# Patient Record
Sex: FEMALE | Race: WHITE | URBAN - METROPOLITAN AREA
[De-identification: names, ages, dates, MRNs, and addresses within clinical notes are randomized per-mention and may not be internally consistent; named-entity substitution may affect disease eponyms.]

---

## 2017-08-23 ENCOUNTER — OUTPATIENT (OUTPATIENT)
Dept: OUTPATIENT SERVICES | Facility: HOSPITAL | Age: 67
LOS: 1 days | Discharge: HOME | End: 2017-08-23

## 2017-08-23 DIAGNOSIS — Z12.31 ENCOUNTER FOR SCREENING MAMMOGRAM FOR MALIGNANT NEOPLASM OF BREAST: ICD-10-CM

## 2017-08-28 ENCOUNTER — OUTPATIENT (OUTPATIENT)
Dept: OUTPATIENT SERVICES | Facility: HOSPITAL | Age: 67
LOS: 1 days | Discharge: HOME | End: 2017-08-28

## 2017-08-28 DIAGNOSIS — R92.8 OTHER ABNORMAL AND INCONCLUSIVE FINDINGS ON DIAGNOSTIC IMAGING OF BREAST: ICD-10-CM

## 2018-05-28 ENCOUNTER — TRANSCRIPTION ENCOUNTER (OUTPATIENT)
Age: 68
End: 2018-05-28

## 2018-09-24 ENCOUNTER — OUTPATIENT (OUTPATIENT)
Dept: OUTPATIENT SERVICES | Facility: HOSPITAL | Age: 68
LOS: 1 days | Discharge: HOME | End: 2018-09-24

## 2018-09-24 DIAGNOSIS — Z12.31 ENCOUNTER FOR SCREENING MAMMOGRAM FOR MALIGNANT NEOPLASM OF BREAST: ICD-10-CM

## 2018-10-30 ENCOUNTER — OUTPATIENT (OUTPATIENT)
Dept: OUTPATIENT SERVICES | Facility: HOSPITAL | Age: 68
LOS: 1 days | Discharge: HOME | End: 2018-10-30

## 2018-10-30 DIAGNOSIS — R92.2 INCONCLUSIVE MAMMOGRAM: ICD-10-CM

## 2018-10-31 DIAGNOSIS — Z13.820 ENCOUNTER FOR SCREENING FOR OSTEOPOROSIS: ICD-10-CM

## 2018-10-31 DIAGNOSIS — Z78.0 ASYMPTOMATIC MENOPAUSAL STATE: ICD-10-CM

## 2018-10-31 DIAGNOSIS — Z87.310 PERSONAL HISTORY OF (HEALED) OSTEOPOROSIS FRACTURE: ICD-10-CM

## 2018-10-31 DIAGNOSIS — E55.9 VITAMIN D DEFICIENCY, UNSPECIFIED: ICD-10-CM

## 2018-10-31 DIAGNOSIS — M89.9 DISORDER OF BONE, UNSPECIFIED: ICD-10-CM

## 2019-01-15 ENCOUNTER — OUTPATIENT (OUTPATIENT)
Dept: OUTPATIENT SERVICES | Facility: HOSPITAL | Age: 69
LOS: 1 days | Discharge: HOME | End: 2019-01-15

## 2019-01-15 DIAGNOSIS — D51.9 VITAMIN B12 DEFICIENCY ANEMIA, UNSPECIFIED: ICD-10-CM

## 2019-10-25 ENCOUNTER — OUTPATIENT (OUTPATIENT)
Dept: OUTPATIENT SERVICES | Facility: HOSPITAL | Age: 69
LOS: 1 days | Discharge: HOME | End: 2019-10-25
Payer: MEDICARE

## 2019-10-25 DIAGNOSIS — Z12.31 ENCOUNTER FOR SCREENING MAMMOGRAM FOR MALIGNANT NEOPLASM OF BREAST: ICD-10-CM

## 2019-10-25 PROCEDURE — 77067 SCR MAMMO BI INCL CAD: CPT | Mod: 26

## 2019-10-25 PROCEDURE — 77063 BREAST TOMOSYNTHESIS BI: CPT | Mod: 26

## 2019-12-10 ENCOUNTER — APPOINTMENT (OUTPATIENT)
Dept: SURGERY | Facility: CLINIC | Age: 69
End: 2019-12-10
Payer: MEDICARE

## 2019-12-10 VITALS — WEIGHT: 153 LBS | HEIGHT: 68 IN | BODY MASS INDEX: 23.19 KG/M2

## 2019-12-10 PROCEDURE — 99203 OFFICE O/P NEW LOW 30 MIN: CPT

## 2019-12-10 NOTE — ASSESSMENT
[FreeTextEntry1] : Amber is a pleasant 69-year-old retired woman with a past medical history significant for hypothyroidism, reflex sympathetic dystrophy of the left upper extremity, an open cholecystectomy 42 years ago and a left inguinal hernia repair with mesh by Dr. Kirby 7 years ago now presenting with concerns about intermittent pain and swelling in the right groin suspicious for another hernia.\par \par Physical examination demonstrates a large strawberry size tender bulge in the right groin which is reducible with a moderate degree of difficulty consistent with a large protruding symptomatic right inguinal hernia warranting surgical repair. There is no evidence of incarceration or strangulation, and the patient denies any symptoms of obstruction.  Examination of her left groin demonstrates a mild weakness but no evidence of hernia recurrence. Her umbilical examination is unremarkable. She has no evidence of an incisional hernia. Her weight is normal with a current BMI of 23.\par \par I explained the pros and cons of surgery, as well as all risks, benefits, indications and alternatives of the procedure and the patient understood and agreed. Amber has a cruise scheduled for April and would prefer surgical intervention prior to that time. She was scheduled for the repair of her right inguinal hernia with mesh on Friday, January 17, 2020 under local with IV sedation at the [facility] with presurgical testing waived.  The patient was encouraged to avoid heavy lifting and strenuous activity in the interim, of course.

## 2019-12-10 NOTE — CONSULT LETTER
[Dear  ___] : Dear  [unfilled], [Courtesy Letter:] : I had the pleasure of seeing your patient, [unfilled], in my office today. [Consult Closing:] : Thank you very much for allowing me to participate in the care of this patient.  If you have any questions, please do not hesitate to contact me. [Please see my note below.] : Please see my note below. [DrYaakov  ___] : Dr. BEJARANO [FreeTextEntry3] : Respectfully,\par \par Jaylon Barrett M.D., FACS\par

## 2019-12-10 NOTE — PHYSICAL EXAM
[Normal Breath Sounds] : Normal breath sounds [No Rash or Lesion] : No rash or lesion [Alert] : alert [Calm] : calm [JVD] : no jugular venous distention  [de-identified] : soft and flat abdomen\par  [de-identified] : normal [de-identified] : healthy [de-identified] : right inguinal hernia

## 2020-01-17 ENCOUNTER — OUTPATIENT (OUTPATIENT)
Dept: OUTPATIENT SERVICES | Facility: HOSPITAL | Age: 70
LOS: 1 days | Discharge: HOME | End: 2020-01-17

## 2020-01-17 ENCOUNTER — APPOINTMENT (OUTPATIENT)
Dept: SURGERY | Facility: AMBULATORY SURGERY CENTER | Age: 70
End: 2020-01-17
Payer: MEDICARE

## 2020-01-17 VITALS
TEMPERATURE: 98 F | OXYGEN SATURATION: 97 % | SYSTOLIC BLOOD PRESSURE: 154 MMHG | DIASTOLIC BLOOD PRESSURE: 69 MMHG | HEART RATE: 69 BPM | WEIGHT: 147.05 LBS | HEIGHT: 69 IN | RESPIRATION RATE: 18 BRPM

## 2020-01-17 VITALS
HEART RATE: 64 BPM | DIASTOLIC BLOOD PRESSURE: 60 MMHG | SYSTOLIC BLOOD PRESSURE: 117 MMHG | RESPIRATION RATE: 13 BRPM | OXYGEN SATURATION: 99 %

## 2020-01-17 DIAGNOSIS — Z90.49 ACQUIRED ABSENCE OF OTHER SPECIFIED PARTS OF DIGESTIVE TRACT: Chronic | ICD-10-CM

## 2020-01-17 DIAGNOSIS — Z98.49 CATARACT EXTRACTION STATUS, UNSPECIFIED EYE: Chronic | ICD-10-CM

## 2020-01-17 DIAGNOSIS — Z98.890 OTHER SPECIFIED POSTPROCEDURAL STATES: Chronic | ICD-10-CM

## 2020-01-17 PROCEDURE — 49505 PRP I/HERN INIT REDUC >5 YR: CPT | Mod: RT

## 2020-01-17 RX ORDER — SODIUM CHLORIDE 9 MG/ML
1000 INJECTION, SOLUTION INTRAVENOUS
Refills: 0 | Status: DISCONTINUED | OUTPATIENT
Start: 2020-01-17 | End: 2020-02-03

## 2020-01-17 RX ORDER — LEVOTHYROXINE SODIUM 125 MCG
1 TABLET ORAL
Qty: 0 | Refills: 0 | DISCHARGE

## 2020-01-17 RX ORDER — ASPIRIN/CALCIUM CARB/MAGNESIUM 324 MG
1 TABLET ORAL
Qty: 0 | Refills: 0 | DISCHARGE

## 2020-01-17 RX ORDER — ONDANSETRON 8 MG/1
4 TABLET, FILM COATED ORAL ONCE
Refills: 0 | Status: DISCONTINUED | OUTPATIENT
Start: 2020-01-17 | End: 2020-02-03

## 2020-01-17 RX ORDER — ACETAMINOPHEN 500 MG
650 TABLET ORAL ONCE
Refills: 0 | Status: COMPLETED | OUTPATIENT
Start: 2020-01-17 | End: 2020-01-17

## 2020-01-17 RX ORDER — TRAMADOL HYDROCHLORIDE 50 MG/1
1 TABLET ORAL
Qty: 20 | Refills: 0
Start: 2020-01-17 | End: 2020-01-21

## 2020-01-17 RX ADMIN — SODIUM CHLORIDE 70 MILLILITER(S): 9 INJECTION, SOLUTION INTRAVENOUS at 16:00

## 2020-01-17 RX ADMIN — Medication 650 MILLIGRAM(S): at 16:40

## 2020-01-17 NOTE — ASU DISCHARGE PLAN (ADULT/PEDIATRIC) - CARE PROVIDER_API CALL
Jaylon Barrett)  Surgery  [address] [facility], [facility] [address]  [place] [address]  Phone: [phone]  Fax: [phone]  Scheduled Appointment: 01/28/2020

## 2020-01-24 DIAGNOSIS — K40.90 UNILATERAL INGUINAL HERNIA, WITHOUT OBSTRUCTION OR GANGRENE, NOT SPECIFIED AS RECURRENT: ICD-10-CM

## 2020-01-24 DIAGNOSIS — Z88.5 ALLERGY STATUS TO NARCOTIC AGENT: ICD-10-CM

## 2020-01-24 DIAGNOSIS — Z79.82 LONG TERM (CURRENT) USE OF ASPIRIN: ICD-10-CM

## 2020-01-24 DIAGNOSIS — Z91.041 RADIOGRAPHIC DYE ALLERGY STATUS: ICD-10-CM

## 2020-01-24 DIAGNOSIS — Z88.0 ALLERGY STATUS TO PENICILLIN: ICD-10-CM

## 2020-01-28 ENCOUNTER — APPOINTMENT (OUTPATIENT)
Dept: SURGERY | Facility: CLINIC | Age: 70
End: 2020-01-28
Payer: MEDICARE

## 2020-01-28 PROCEDURE — 99024 POSTOP FOLLOW-UP VISIT: CPT

## 2020-01-28 NOTE — CONSULT LETTER
[FreeTextEntry1] : Dear Dr. Jayden Altamirano, \par \par I had the pleasure of seeing your patient, MICHELLE MYHRE, in my office today. Please see my note below. \par \par Thank you very much for allowing me to participate in the care of this patient. If you have any questions, please do not hesitate to contact me. \par \par \par Respectfully,\par \par Jaylon Barrett M.D., FACS\par  \par \par \par \par cc: Dr. Chi Myers

## 2020-01-28 NOTE — ASSESSMENT
[FreeTextEntry1] : Amber underwent the repair of her very large pantaloon right inguinal hernia with mesh on January 17, 2020 under local with IV sedation without any problems or complications. Her wound is clean, dry and intact. There is no evidence of erythema, seroma formation or infection. She is tolerating a diet and having normal bowel movements. She denies any significant postoperative pain or discomfort at this time, and she did not take any narcotic medication after her procedure.\par \par Amber was counseled and reassured. She was discharged from the office with no specific followup necessary, but she knows to avoid any heavy lifting or strenuous activity for the next several weeks.

## 2020-10-06 PROBLEM — G90.50 COMPLEX REGIONAL PAIN SYNDROME I, UNSPECIFIED: Chronic | Status: ACTIVE | Noted: 2020-01-17

## 2020-10-06 PROBLEM — K46.9 UNSPECIFIED ABDOMINAL HERNIA WITHOUT OBSTRUCTION OR GANGRENE: Chronic | Status: ACTIVE | Noted: 2020-01-17

## 2020-11-05 ENCOUNTER — OUTPATIENT (OUTPATIENT)
Dept: OUTPATIENT SERVICES | Facility: HOSPITAL | Age: 70
LOS: 1 days | Discharge: HOME | End: 2020-11-05
Payer: MEDICARE

## 2020-11-05 DIAGNOSIS — Z98.49 CATARACT EXTRACTION STATUS, UNSPECIFIED EYE: Chronic | ICD-10-CM

## 2020-11-05 DIAGNOSIS — Z98.890 OTHER SPECIFIED POSTPROCEDURAL STATES: Chronic | ICD-10-CM

## 2020-11-05 DIAGNOSIS — Z12.31 ENCOUNTER FOR SCREENING MAMMOGRAM FOR MALIGNANT NEOPLASM OF BREAST: ICD-10-CM

## 2020-11-05 DIAGNOSIS — Z90.49 ACQUIRED ABSENCE OF OTHER SPECIFIED PARTS OF DIGESTIVE TRACT: Chronic | ICD-10-CM

## 2020-11-05 PROCEDURE — 77063 BREAST TOMOSYNTHESIS BI: CPT | Mod: 26

## 2020-11-05 PROCEDURE — 77067 SCR MAMMO BI INCL CAD: CPT | Mod: 26

## 2020-11-10 ENCOUNTER — APPOINTMENT (OUTPATIENT)
Dept: SURGERY | Facility: CLINIC | Age: 70
End: 2020-11-10
Payer: MEDICARE

## 2020-11-10 VITALS — BODY MASS INDEX: 21.98 KG/M2 | HEIGHT: 68 IN | WEIGHT: 145 LBS

## 2020-11-10 DIAGNOSIS — K40.90 UNILATERAL INGUINAL HERNIA, W/OUT OBSTRUCTION OR GANGRENE, NOT SPECIFIED AS RECURRENT: ICD-10-CM

## 2020-11-10 PROCEDURE — 99213 OFFICE O/P EST LOW 20 MIN: CPT

## 2020-11-10 PROCEDURE — 99214 OFFICE O/P EST MOD 30 MIN: CPT

## 2020-11-10 NOTE — ASSESSMENT
[FreeTextEntry1] : Amber presents back to the office approximately 10 months after the repair of her very large pantaloon right inguinal hernia with mesh complaining of intermittent tenderness and burning which began in July and has persisted since then. She claims she was perfect prior to that time with actually no symptoms. She became more active during the summer, of course, and enjoys walking regularly. She is often carrying heavy packages from the grocery store and doing moderate physical activity around the house. She does not remember one specific event which precipitated her symptoms.\par \par Physical examination demonstrates a well healed scar in the right groin with no evidence of hernia recurrence or delayed wound complications. She does have tenderness to deep palpation which is consistent with a chronic groin strain. She does intermittently have symptoms in the left groin as well but her examination of this area is normal as well with no hernia recurrence in the left groin either. Her umbilical examination is unremarkable. She lost 8 pounds over the last year and her current BMI is 22.\par \par Amber was counseled and reassured. I believe she sustained a significant right groin strain which is now chronic due to overexertion and I recommended alternating ice/heat therapy and nonsteroidal anti-inflammatory medication for the next 2 weeks, and avoiding strenuous physical activity whenever possible. I believe her symptoms will improve with time. She was encouraged to return to me in the future if these symptoms persist or worsen, of course.

## 2020-11-10 NOTE — PHYSICAL EXAM
[JVD] : no jugular venous distention  [Normal Breath Sounds] : Normal breath sounds [No Rash or Lesion] : No rash or lesion [Alert] : alert [Calm] : calm [de-identified] : healthy [de-identified] : normal [de-identified] : soft and flat abdomen\par  [de-identified] : no hernia recurrence in either groin

## 2021-01-11 ENCOUNTER — TRANSCRIPTION ENCOUNTER (OUTPATIENT)
Age: 71
End: 2021-01-11

## 2021-07-23 ENCOUNTER — RESULT REVIEW (OUTPATIENT)
Age: 71
End: 2021-07-23

## 2021-09-03 ENCOUNTER — TRANSCRIPTION ENCOUNTER (OUTPATIENT)
Age: 71
End: 2021-09-03

## 2021-11-18 ENCOUNTER — OUTPATIENT (OUTPATIENT)
Dept: OUTPATIENT SERVICES | Facility: HOSPITAL | Age: 71
LOS: 1 days | Discharge: HOME | End: 2021-11-18

## 2021-11-18 DIAGNOSIS — Z98.49 CATARACT EXTRACTION STATUS, UNSPECIFIED EYE: Chronic | ICD-10-CM

## 2021-11-18 DIAGNOSIS — Z90.49 ACQUIRED ABSENCE OF OTHER SPECIFIED PARTS OF DIGESTIVE TRACT: Chronic | ICD-10-CM

## 2021-11-18 DIAGNOSIS — Z98.890 OTHER SPECIFIED POSTPROCEDURAL STATES: Chronic | ICD-10-CM

## 2021-11-22 DIAGNOSIS — Z13.820 ENCOUNTER FOR SCREENING FOR OSTEOPOROSIS: ICD-10-CM

## 2021-11-22 DIAGNOSIS — Z87.310 PERSONAL HISTORY OF (HEALED) OSTEOPOROSIS FRACTURE: ICD-10-CM

## 2021-11-22 DIAGNOSIS — Z78.0 ASYMPTOMATIC MENOPAUSAL STATE: ICD-10-CM

## 2021-11-22 DIAGNOSIS — M89.9 DISORDER OF BONE, UNSPECIFIED: ICD-10-CM

## 2021-12-15 ENCOUNTER — OUTPATIENT (OUTPATIENT)
Dept: OUTPATIENT SERVICES | Facility: HOSPITAL | Age: 71
LOS: 1 days | Discharge: HOME | End: 2021-12-15
Payer: MEDICARE

## 2021-12-15 DIAGNOSIS — Z12.31 ENCOUNTER FOR SCREENING MAMMOGRAM FOR MALIGNANT NEOPLASM OF BREAST: ICD-10-CM

## 2021-12-15 DIAGNOSIS — Z98.49 CATARACT EXTRACTION STATUS, UNSPECIFIED EYE: Chronic | ICD-10-CM

## 2021-12-15 DIAGNOSIS — Z98.890 OTHER SPECIFIED POSTPROCEDURAL STATES: Chronic | ICD-10-CM

## 2021-12-15 DIAGNOSIS — Z90.49 ACQUIRED ABSENCE OF OTHER SPECIFIED PARTS OF DIGESTIVE TRACT: Chronic | ICD-10-CM

## 2021-12-15 PROCEDURE — 77063 BREAST TOMOSYNTHESIS BI: CPT | Mod: 26

## 2021-12-15 PROCEDURE — 77067 SCR MAMMO BI INCL CAD: CPT | Mod: 26

## 2021-12-17 ENCOUNTER — OUTPATIENT (OUTPATIENT)
Dept: OUTPATIENT SERVICES | Facility: HOSPITAL | Age: 71
LOS: 1 days | Discharge: HOME | End: 2021-12-17
Payer: MEDICARE

## 2021-12-17 DIAGNOSIS — Z90.49 ACQUIRED ABSENCE OF OTHER SPECIFIED PARTS OF DIGESTIVE TRACT: Chronic | ICD-10-CM

## 2021-12-17 DIAGNOSIS — Z98.890 OTHER SPECIFIED POSTPROCEDURAL STATES: Chronic | ICD-10-CM

## 2021-12-17 DIAGNOSIS — Z98.49 CATARACT EXTRACTION STATUS, UNSPECIFIED EYE: Chronic | ICD-10-CM

## 2021-12-17 DIAGNOSIS — R92.2 INCONCLUSIVE MAMMOGRAM: ICD-10-CM

## 2021-12-17 DIAGNOSIS — Z80.3 FAMILY HISTORY OF MALIGNANT NEOPLASM OF BREAST: ICD-10-CM

## 2021-12-17 PROCEDURE — 77049 MRI BREAST C-+ W/CAD BI: CPT | Mod: 26

## 2022-04-01 ENCOUNTER — TRANSCRIPTION ENCOUNTER (OUTPATIENT)
Age: 72
End: 2022-04-01

## 2022-04-26 ENCOUNTER — APPOINTMENT (OUTPATIENT)
Dept: SURGERY | Facility: CLINIC | Age: 72
End: 2022-04-26
Payer: MEDICARE

## 2022-04-26 VITALS — WEIGHT: 147 LBS | BODY MASS INDEX: 22.28 KG/M2 | HEIGHT: 68 IN

## 2022-04-26 DIAGNOSIS — S76.219A STRAIN OF ADDUCTOR MUSCLE, FASCIA AND TENDON OF UNSPECIFIED THIGH, INITIAL ENCOUNTER: ICD-10-CM

## 2022-04-26 DIAGNOSIS — R10.31 RIGHT LOWER QUADRANT PAIN: ICD-10-CM

## 2022-04-26 PROCEDURE — 99214 OFFICE O/P EST MOD 30 MIN: CPT

## 2022-04-26 NOTE — PHYSICAL EXAM
[JVD] : no jugular venous distention  [Normal Breath Sounds] : Normal breath sounds [No Rash or Lesion] : No rash or lesion [Alert] : alert [Calm] : calm [de-identified] : healthy [de-identified] : normal [de-identified] : soft and flat abdomen\par  [de-identified] : no hernia recurrence in either groin

## 2022-04-26 NOTE — CONSULT LETTER
[FreeTextEntry1] : Dear Dr. Jayden Altamirano, \par \par I had the pleasure of seeing your patient, MICHELLE MYHRE, in my office today. Please see my note below. \par \par Thank you very much for allowing me to participate in the care of this patient. If you have any questions, please do not hesitate to contact me. \par \par \par Respectfully,\par \par Jaylon Barrett M.D., FACS\par  \par \par \par \par cc: Dr. Chi Myers\par Dr. Anselmo Becker\par

## 2022-04-26 NOTE — ASSESSMENT
[FreeTextEntry1] : Amber is a pleasant 72-year-old retired woman with a past medical history significant for hypercholesterolemia and hypothyroidism along with reflex sympathetic dystrophy of the left upper extremity, and open cholecystectomy 45 years ago and a left inguinal hernia repair with Dr. Kirby nearly 10 years ago followed by the repair of a large pantaloon right inguinal hernia with mesh by me in January 2020 now presenting to the office with several months of pain and discomfort in the right groin causing her concern.  She remains very active, but stopped playing [employer] ball recently due to symptoms.\par \par Physical examination demonstrates a well-healed scar in both groins with no evidence of hernia recurrence or delayed wound complications.  She does have a moderate degree of tenderness to deep palpation in the inguinal canal consistent with a significant muscle strain.  Her umbilical examination is unremarkable.  Her BMI remains 22.\par \par Amber was counseled and reassured.  I believe her symptoms are related to a significant muscle strain due to overexertion and I recommended alternating ice/heat therapy and nonsteroidal anti-inflammatory medication for the next few weeks, and avoiding strenuous physical activity whenever possible during that time frame.  She has been on gabapentin in the past and if her symptoms persist it may be an option she will discuss with her pain management physician.  She was encouraged to return to me in the future if these symptoms persist or worsen, of course.

## 2022-05-09 ENCOUNTER — APPOINTMENT (OUTPATIENT)
Dept: CARDIOLOGY | Facility: CLINIC | Age: 72
End: 2022-05-09
Payer: MEDICARE

## 2022-05-09 VITALS
DIASTOLIC BLOOD PRESSURE: 70 MMHG | HEIGHT: 68 IN | HEART RATE: 60 BPM | WEIGHT: 148 LBS | TEMPERATURE: 97.2 F | SYSTOLIC BLOOD PRESSURE: 128 MMHG | BODY MASS INDEX: 22.43 KG/M2

## 2022-05-09 DIAGNOSIS — Z00.00 ENCOUNTER FOR GENERAL ADULT MEDICAL EXAMINATION W/OUT ABNORMAL FINDINGS: ICD-10-CM

## 2022-05-09 DIAGNOSIS — R07.89 OTHER CHEST PAIN: ICD-10-CM

## 2022-05-09 PROCEDURE — 93000 ELECTROCARDIOGRAM COMPLETE: CPT | Mod: 59

## 2022-05-09 PROCEDURE — 93242 EXT ECG>48HR<7D RECORDING: CPT

## 2022-05-09 PROCEDURE — 99204 OFFICE O/P NEW MOD 45 MIN: CPT | Mod: 25

## 2022-05-09 RX ORDER — LEVOTHYROXINE SODIUM 88 UG/1
88 TABLET ORAL DAILY
Refills: 0 | Status: ACTIVE | COMMUNITY

## 2022-05-09 RX ORDER — SIMVASTATIN 20 MG/1
20 TABLET, FILM COATED ORAL
Refills: 0 | Status: ACTIVE | COMMUNITY

## 2022-05-09 NOTE — PHYSICAL EXAM
[Well Developed] : well developed [Well Nourished] : well nourished [No Acute Distress] : no acute distress [Normal Conjunctiva] : normal conjunctiva [Normal Venous Pressure] : normal venous pressure [No Carotid Bruit] : no carotid bruit [Normal S1, S2] : normal S1, S2 [No Murmur] : no murmur [No Rub] : no rub [No Gallop] : no gallop [Clear Lung Fields] : clear lung fields [Good Air Entry] : good air entry [No Respiratory Distress] : no respiratory distress  [Soft] : abdomen soft [Non Tender] : non-tender [No Masses/organomegaly] : no masses/organomegaly [Normal Bowel Sounds] : normal bowel sounds [Normal Gait] : normal gait [No Cyanosis] : no cyanosis [No Clubbing] : no clubbing [Venous varicosities] : venous varicosities [No Rash] : no rash [No Skin Lesions] : no skin lesions [Moves all extremities] : moves all extremities [No Focal Deficits] : no focal deficits [Normal Speech] : normal speech [Alert and Oriented] : alert and oriented [Normal memory] : normal memory [de-identified] : Mild left lower extremity edema

## 2022-05-09 NOTE — HISTORY OF PRESENT ILLNESS
[FreeTextEntry1] : History of hyperlipidemia\par Familial history of heart disease, her mother had a thoracic aortic aneurysm and a myocardial infarction with stenting of her LAD,  She is  and lived to 93 yrs of age.\par The patient denies a history of MI,angina, DM, Hypertension, smoking, obesity.\par She had a Cardiac CTA at the [facility] 15 years ago and the results were normal at that time. That report is not available for review.\par PSurgHx: GB surgery 45 years ago, surgery on her left hand in  and , Bilateral hernia surgeries with Dr. Barrett,  and LLE vein surgery with Dr. Dewitt.\par Hypothyroidism

## 2022-05-09 NOTE — REASON FOR VISIT
[FreeTextEntry1] : Pleasant 72 year old WF presents for a Cardiology evaluation due to symptoms of palpitations associated with chest pains which awoke her from sleep. The symptoms persisted for about one hour. She did not seek medical attention, but she has a familial history of heart disease and risk factors ie. hyperlipidemia.\par This episode occurred approx. 1 month ago and she has been concerned and has not exercised or increased her activity level due to concerns for having them recur.

## 2022-05-09 NOTE — REVIEW OF SYSTEMS
[Palpitations] : palpitations [Negative] : Heme/Lymph [FreeTextEntry5] : Atypical chest pain, nonexertional

## 2022-05-09 NOTE — DISCUSSION/SUMMARY
[FreeTextEntry1] : IV adenosine thallium stress test\par 2D echo doppler\par Holter monitor\par Patient was instructed to target her T. Cholesterol to less than 200 mg/dl and LDL cholesterol to less than 100 mg/dl.\par Maintain present medications. \par Patient was advised to provide a copy of her  BMP, CBC , fasting lipid profile and hepatic panel.\par RV in 2 weeks.

## 2022-05-09 NOTE — ASSESSMENT
[FreeTextEntry1] : Atypical chest pain\par Hyperlipidemia\par Palpitations\par R/O ASHD\par Familial history of heart disease

## 2022-05-18 ENCOUNTER — APPOINTMENT (OUTPATIENT)
Dept: CARDIOLOGY | Facility: CLINIC | Age: 72
End: 2022-05-18
Payer: MEDICARE

## 2022-05-18 PROCEDURE — 93306 TTE W/DOPPLER COMPLETE: CPT

## 2022-05-21 ENCOUNTER — LABORATORY RESULT (OUTPATIENT)
Age: 72
End: 2022-05-21

## 2022-05-24 ENCOUNTER — OUTPATIENT (OUTPATIENT)
Dept: OUTPATIENT SERVICES | Facility: HOSPITAL | Age: 72
LOS: 1 days | Discharge: HOME | End: 2022-05-24
Payer: MEDICARE

## 2022-05-24 ENCOUNTER — RESULT REVIEW (OUTPATIENT)
Age: 72
End: 2022-05-24

## 2022-05-24 DIAGNOSIS — Z98.49 CATARACT EXTRACTION STATUS, UNSPECIFIED EYE: Chronic | ICD-10-CM

## 2022-05-24 DIAGNOSIS — R07.9 CHEST PAIN, UNSPECIFIED: ICD-10-CM

## 2022-05-24 DIAGNOSIS — Z98.890 OTHER SPECIFIED POSTPROCEDURAL STATES: Chronic | ICD-10-CM

## 2022-05-24 DIAGNOSIS — Z90.49 ACQUIRED ABSENCE OF OTHER SPECIFIED PARTS OF DIGESTIVE TRACT: Chronic | ICD-10-CM

## 2022-05-24 PROCEDURE — 93018 CV STRESS TEST I&R ONLY: CPT

## 2022-05-24 PROCEDURE — 78452 HT MUSCLE IMAGE SPECT MULT: CPT | Mod: 26,MF

## 2022-05-24 PROCEDURE — G1004: CPT

## 2022-05-24 PROCEDURE — 93016 CV STRESS TEST SUPVJ ONLY: CPT

## 2022-05-26 ENCOUNTER — APPOINTMENT (OUTPATIENT)
Dept: CARDIOLOGY | Facility: CLINIC | Age: 72
End: 2022-05-26
Payer: MEDICARE

## 2022-05-26 VITALS
WEIGHT: 148 LBS | BODY MASS INDEX: 22.43 KG/M2 | DIASTOLIC BLOOD PRESSURE: 80 MMHG | SYSTOLIC BLOOD PRESSURE: 130 MMHG | HEIGHT: 68 IN | TEMPERATURE: 96.2 F

## 2022-05-26 DIAGNOSIS — E78.5 HYPERLIPIDEMIA, UNSPECIFIED: ICD-10-CM

## 2022-05-26 DIAGNOSIS — R00.2 PALPITATIONS: ICD-10-CM

## 2022-05-26 PROCEDURE — 99214 OFFICE O/P EST MOD 30 MIN: CPT

## 2022-05-26 NOTE — HISTORY OF PRESENT ILLNESS
[FreeTextEntry1] : History of hyperlipidemia\par Familial history of heart disease, her mother had a thoracic aortic aneurysm and a myocardial infarction with stenting of her LAD,  She is  and lived to 93 yrs of age.\par The patient denies a history of MI,angina, DM, Hypertension, smoking, obesity.\par She had a Cardiac CTA at the [facility] 15 years ago and the results were normal at that time. That report is not available for review.\par PSurgHx: GB surgery 45 years ago, surgery on her left hand in  and , Bilateral hernia surgeries with Dr. Barrett,  and LLE vein surgery with Dr. Dewitt.\par Hypothyroidism

## 2022-05-26 NOTE — PHYSICAL EXAM
[Well Developed] : well developed [Well Nourished] : well nourished [No Acute Distress] : no acute distress [Normal Conjunctiva] : normal conjunctiva [Normal Venous Pressure] : normal venous pressure [No Carotid Bruit] : no carotid bruit [Normal S1, S2] : normal S1, S2 [No Murmur] : no murmur [No Rub] : no rub [No Gallop] : no gallop [Clear Lung Fields] : clear lung fields [Good Air Entry] : good air entry [No Respiratory Distress] : no respiratory distress  [Soft] : abdomen soft [Non Tender] : non-tender [No Masses/organomegaly] : no masses/organomegaly [Normal Bowel Sounds] : normal bowel sounds [Normal Gait] : normal gait [No Cyanosis] : no cyanosis [No Clubbing] : no clubbing [Venous varicosities] : venous varicosities [No Rash] : no rash [No Skin Lesions] : no skin lesions [Moves all extremities] : moves all extremities [No Focal Deficits] : no focal deficits [Normal Speech] : normal speech [Alert and Oriented] : alert and oriented [Normal memory] : normal memory [de-identified] : Mild left lower extremity edema

## 2022-05-26 NOTE — DISCUSSION/SUMMARY
[FreeTextEntry1] : IV adenosine thallium stress test, 2D echo doppler, and Holter monitor results were reviewed with the patient in detail.\par Patient was instructed to target her T. Cholesterol to less than 200 mg/dl and LDL cholesterol to less than 100 mg/dl.\par Maintain present medications. \par Medical therapy is advised.\par Patient was advised to provide a copy of her  BMP, CBC , fasting lipid profile and hepatic panel.\par She is at target goal with respect to her lipid and glucose levels.\par RV in 1 year. no

## 2022-05-26 NOTE — REVIEW OF SYSTEMS
[Negative] : Heme/Lymph [Palpitations] : no palpitations [FreeTextEntry5] : Atypical chest pain, nonexertional

## 2022-05-26 NOTE — REASON FOR VISIT
[FreeTextEntry1] : Pleasant 72 year old WF presents for a Cardiology evaluation due to symptoms of palpitations associated with chest pains which awoke her from sleep. The symptoms persisted for about one hour. She did not seek medical attention, but she has a familial history of heart disease and risk factors ie. hyperlipidemia.\par The patient is here for the results of her Holter, echo doppler, adenosine thallium and lab test.

## 2022-05-26 NOTE — ASSESSMENT
[FreeTextEntry1] : Negative adenosine thallium stress test\par Normal LV systolic function\par Hyperlipidemia\par No arrhythmia noted on Holter monitor\par Familial history of heart disease

## 2022-08-06 ENCOUNTER — NON-APPOINTMENT (OUTPATIENT)
Age: 72
End: 2022-08-06

## 2022-12-04 ENCOUNTER — NON-APPOINTMENT (OUTPATIENT)
Age: 72
End: 2022-12-04

## 2022-12-20 ENCOUNTER — OUTPATIENT (OUTPATIENT)
Dept: OUTPATIENT SERVICES | Facility: HOSPITAL | Age: 72
LOS: 1 days | Discharge: HOME | End: 2022-12-20

## 2022-12-20 DIAGNOSIS — Z12.31 ENCOUNTER FOR SCREENING MAMMOGRAM FOR MALIGNANT NEOPLASM OF BREAST: ICD-10-CM

## 2022-12-20 DIAGNOSIS — Z90.49 ACQUIRED ABSENCE OF OTHER SPECIFIED PARTS OF DIGESTIVE TRACT: Chronic | ICD-10-CM

## 2022-12-20 DIAGNOSIS — R92.2 INCONCLUSIVE MAMMOGRAM: ICD-10-CM

## 2022-12-20 DIAGNOSIS — Z98.890 OTHER SPECIFIED POSTPROCEDURAL STATES: Chronic | ICD-10-CM

## 2022-12-20 DIAGNOSIS — Z98.49 CATARACT EXTRACTION STATUS, UNSPECIFIED EYE: Chronic | ICD-10-CM

## 2022-12-20 PROCEDURE — 77067 SCR MAMMO BI INCL CAD: CPT | Mod: 26

## 2022-12-20 PROCEDURE — 76641 ULTRASOUND BREAST COMPLETE: CPT | Mod: 26,50

## 2022-12-20 PROCEDURE — 77063 BREAST TOMOSYNTHESIS BI: CPT | Mod: 26

## 2023-03-01 ENCOUNTER — TRANSCRIPTION ENCOUNTER (OUTPATIENT)
Age: 73
End: 2023-03-01

## 2023-12-21 ENCOUNTER — OUTPATIENT (OUTPATIENT)
Dept: OUTPATIENT SERVICES | Facility: HOSPITAL | Age: 73
LOS: 1 days | End: 2023-12-21
Payer: MEDICARE

## 2023-12-21 DIAGNOSIS — R92.2 INCONCLUSIVE MAMMOGRAM: ICD-10-CM

## 2023-12-21 DIAGNOSIS — Z12.31 ENCOUNTER FOR SCREENING MAMMOGRAM FOR MALIGNANT NEOPLASM OF BREAST: ICD-10-CM

## 2023-12-21 DIAGNOSIS — Z90.49 ACQUIRED ABSENCE OF OTHER SPECIFIED PARTS OF DIGESTIVE TRACT: Chronic | ICD-10-CM

## 2023-12-21 DIAGNOSIS — Z98.890 OTHER SPECIFIED POSTPROCEDURAL STATES: Chronic | ICD-10-CM

## 2023-12-21 DIAGNOSIS — Z98.49 CATARACT EXTRACTION STATUS, UNSPECIFIED EYE: Chronic | ICD-10-CM

## 2023-12-21 DIAGNOSIS — Z13.820 ENCOUNTER FOR SCREENING FOR OSTEOPOROSIS: ICD-10-CM

## 2023-12-21 PROCEDURE — 77063 BREAST TOMOSYNTHESIS BI: CPT

## 2023-12-21 PROCEDURE — 77067 SCR MAMMO BI INCL CAD: CPT | Mod: 26

## 2023-12-21 PROCEDURE — 77080 DXA BONE DENSITY AXIAL: CPT

## 2023-12-21 PROCEDURE — 77067 SCR MAMMO BI INCL CAD: CPT

## 2023-12-21 PROCEDURE — 77063 BREAST TOMOSYNTHESIS BI: CPT | Mod: 26

## 2023-12-22 DIAGNOSIS — Z13.820 ENCOUNTER FOR SCREENING FOR OSTEOPOROSIS: ICD-10-CM

## 2023-12-22 DIAGNOSIS — Z12.31 ENCOUNTER FOR SCREENING MAMMOGRAM FOR MALIGNANT NEOPLASM OF BREAST: ICD-10-CM

## 2024-08-08 ENCOUNTER — NON-APPOINTMENT (OUTPATIENT)
Age: 74
End: 2024-08-08

## 2024-10-23 ENCOUNTER — NON-APPOINTMENT (OUTPATIENT)
Age: 74
End: 2024-10-23

## 2024-10-28 ENCOUNTER — APPOINTMENT (OUTPATIENT)
Dept: CARDIOLOGY | Facility: CLINIC | Age: 74
End: 2024-10-28
Payer: MEDICARE

## 2024-10-28 VITALS
DIASTOLIC BLOOD PRESSURE: 64 MMHG | WEIGHT: 148 LBS | SYSTOLIC BLOOD PRESSURE: 110 MMHG | BODY MASS INDEX: 22.43 KG/M2 | HEART RATE: 60 BPM | HEIGHT: 68 IN

## 2024-10-28 DIAGNOSIS — E78.5 HYPERLIPIDEMIA, UNSPECIFIED: ICD-10-CM

## 2024-10-28 PROCEDURE — 93000 ELECTROCARDIOGRAM COMPLETE: CPT

## 2024-10-28 PROCEDURE — 99214 OFFICE O/P EST MOD 30 MIN: CPT

## 2025-01-08 ENCOUNTER — OUTPATIENT (OUTPATIENT)
Dept: OUTPATIENT SERVICES | Facility: HOSPITAL | Age: 75
LOS: 1 days | End: 2025-01-08
Payer: MEDICARE

## 2025-01-08 DIAGNOSIS — Z98.890 OTHER SPECIFIED POSTPROCEDURAL STATES: Chronic | ICD-10-CM

## 2025-01-08 DIAGNOSIS — Z12.31 ENCOUNTER FOR SCREENING MAMMOGRAM FOR MALIGNANT NEOPLASM OF BREAST: ICD-10-CM

## 2025-01-08 DIAGNOSIS — Z98.49 CATARACT EXTRACTION STATUS, UNSPECIFIED EYE: Chronic | ICD-10-CM

## 2025-01-08 DIAGNOSIS — Z90.49 ACQUIRED ABSENCE OF OTHER SPECIFIED PARTS OF DIGESTIVE TRACT: Chronic | ICD-10-CM

## 2025-01-08 PROCEDURE — 77067 SCR MAMMO BI INCL CAD: CPT

## 2025-01-08 PROCEDURE — 77067 SCR MAMMO BI INCL CAD: CPT | Mod: 26

## 2025-01-08 PROCEDURE — 77063 BREAST TOMOSYNTHESIS BI: CPT | Mod: 26

## 2025-01-08 PROCEDURE — 77063 BREAST TOMOSYNTHESIS BI: CPT

## 2025-01-09 DIAGNOSIS — Z12.31 ENCOUNTER FOR SCREENING MAMMOGRAM FOR MALIGNANT NEOPLASM OF BREAST: ICD-10-CM

## 2025-02-08 ENCOUNTER — NON-APPOINTMENT (OUTPATIENT)
Age: 75
End: 2025-02-08

## 2025-02-10 ENCOUNTER — INPATIENT (INPATIENT)
Facility: HOSPITAL | Age: 75
LOS: 1 days | Discharge: HOME | DRG: 195 | End: 2025-02-12
Attending: STUDENT IN AN ORGANIZED HEALTH CARE EDUCATION/TRAINING PROGRAM | Admitting: FAMILY MEDICINE
Payer: MEDICARE

## 2025-02-10 ENCOUNTER — NON-APPOINTMENT (OUTPATIENT)
Age: 75
End: 2025-02-10

## 2025-02-10 VITALS
TEMPERATURE: 98 F | WEIGHT: 145.95 LBS | OXYGEN SATURATION: 94 % | DIASTOLIC BLOOD PRESSURE: 78 MMHG | HEART RATE: 61 BPM | SYSTOLIC BLOOD PRESSURE: 138 MMHG | RESPIRATION RATE: 20 BRPM

## 2025-02-10 DIAGNOSIS — Z90.49 ACQUIRED ABSENCE OF OTHER SPECIFIED PARTS OF DIGESTIVE TRACT: Chronic | ICD-10-CM

## 2025-02-10 DIAGNOSIS — Z98.49 CATARACT EXTRACTION STATUS, UNSPECIFIED EYE: Chronic | ICD-10-CM

## 2025-02-10 DIAGNOSIS — J18.9 PNEUMONIA, UNSPECIFIED ORGANISM: ICD-10-CM

## 2025-02-10 DIAGNOSIS — Z98.890 OTHER SPECIFIED POSTPROCEDURAL STATES: Chronic | ICD-10-CM

## 2025-02-10 LAB
ALBUMIN SERPL ELPH-MCNC: 4 G/DL — SIGNIFICANT CHANGE UP (ref 3.5–5.2)
ALP SERPL-CCNC: 72 U/L — SIGNIFICANT CHANGE UP (ref 30–115)
ALT FLD-CCNC: 36 U/L — SIGNIFICANT CHANGE UP (ref 0–41)
ANION GAP SERPL CALC-SCNC: 11 MMOL/L — SIGNIFICANT CHANGE UP (ref 7–14)
AST SERPL-CCNC: 70 U/L — HIGH (ref 0–41)
BASOPHILS # BLD AUTO: 0.02 K/UL — SIGNIFICANT CHANGE UP (ref 0–0.2)
BASOPHILS NFR BLD AUTO: 0.2 % — SIGNIFICANT CHANGE UP (ref 0–1)
BILIRUB SERPL-MCNC: 1 MG/DL — SIGNIFICANT CHANGE UP (ref 0.2–1.2)
BUN SERPL-MCNC: 11 MG/DL — SIGNIFICANT CHANGE UP (ref 10–20)
CALCIUM SERPL-MCNC: 8.6 MG/DL — SIGNIFICANT CHANGE UP (ref 8.4–10.5)
CHLORIDE SERPL-SCNC: 97 MMOL/L — LOW (ref 98–110)
CO2 SERPL-SCNC: 26 MMOL/L — SIGNIFICANT CHANGE UP (ref 17–32)
CREAT SERPL-MCNC: 0.9 MG/DL — SIGNIFICANT CHANGE UP (ref 0.7–1.5)
CRP SERPL-MCNC: 43.5 MG/L — HIGH
EGFR: 68 ML/MIN/1.73M2 — SIGNIFICANT CHANGE UP
EOSINOPHIL # BLD AUTO: 0 K/UL — SIGNIFICANT CHANGE UP (ref 0–0.7)
EOSINOPHIL NFR BLD AUTO: 0 % — SIGNIFICANT CHANGE UP (ref 0–8)
ERYTHROCYTE [SEDIMENTATION RATE] IN BLOOD: 23 MM/HR — HIGH (ref 0–20)
FLUAV AG NPH QL: SIGNIFICANT CHANGE UP
FLUBV AG NPH QL: SIGNIFICANT CHANGE UP
GLUCOSE SERPL-MCNC: 115 MG/DL — HIGH (ref 70–99)
HCT VFR BLD CALC: 46.1 % — SIGNIFICANT CHANGE UP (ref 37–47)
HGB BLD-MCNC: 15.5 G/DL — SIGNIFICANT CHANGE UP (ref 12–16)
IMM GRANULOCYTES NFR BLD AUTO: 0.3 % — SIGNIFICANT CHANGE UP (ref 0.1–0.3)
LACTATE SERPL-SCNC: 0.9 MMOL/L — SIGNIFICANT CHANGE UP (ref 0.7–2)
LYMPHOCYTES # BLD AUTO: 1.01 K/UL — LOW (ref 1.2–3.4)
LYMPHOCYTES # BLD AUTO: 9.7 % — LOW (ref 20.5–51.1)
MAGNESIUM SERPL-MCNC: 1.9 MG/DL — SIGNIFICANT CHANGE UP (ref 1.8–2.4)
MCHC RBC-ENTMCNC: 29.6 PG — SIGNIFICANT CHANGE UP (ref 27–31)
MCHC RBC-ENTMCNC: 33.6 G/DL — SIGNIFICANT CHANGE UP (ref 32–37)
MCV RBC AUTO: 88.1 FL — SIGNIFICANT CHANGE UP (ref 81–99)
MONOCYTES # BLD AUTO: 0.51 K/UL — SIGNIFICANT CHANGE UP (ref 0.1–0.6)
MONOCYTES NFR BLD AUTO: 4.9 % — SIGNIFICANT CHANGE UP (ref 1.7–9.3)
NEUTROPHILS # BLD AUTO: 8.86 K/UL — HIGH (ref 1.4–6.5)
NEUTROPHILS NFR BLD AUTO: 84.9 % — HIGH (ref 42.2–75.2)
NRBC # BLD: 0 /100 WBCS — SIGNIFICANT CHANGE UP (ref 0–0)
NRBC BLD-RTO: 0 /100 WBCS — SIGNIFICANT CHANGE UP (ref 0–0)
NT-PROBNP SERPL-SCNC: 292 PG/ML — SIGNIFICANT CHANGE UP (ref 0–300)
PLATELET # BLD AUTO: 206 K/UL — SIGNIFICANT CHANGE UP (ref 130–400)
PMV BLD: 10.1 FL — SIGNIFICANT CHANGE UP (ref 7.4–10.4)
POTASSIUM SERPL-MCNC: 5.8 MMOL/L — HIGH (ref 3.5–5)
POTASSIUM SERPL-SCNC: 5.8 MMOL/L — HIGH (ref 3.5–5)
PROT SERPL-MCNC: 6.9 G/DL — SIGNIFICANT CHANGE UP (ref 6–8)
RBC # BLD: 5.23 M/UL — SIGNIFICANT CHANGE UP (ref 4.2–5.4)
RBC # FLD: 13.2 % — SIGNIFICANT CHANGE UP (ref 11.5–14.5)
RSV RNA NPH QL NAA+NON-PROBE: SIGNIFICANT CHANGE UP
SARS-COV-2 RNA SPEC QL NAA+PROBE: SIGNIFICANT CHANGE UP
SODIUM SERPL-SCNC: 134 MMOL/L — LOW (ref 135–146)
TROPONIN T, HIGH SENSITIVITY RESULT: 8 NG/L — SIGNIFICANT CHANGE UP (ref 6–13)
WBC # BLD: 10.43 K/UL — SIGNIFICANT CHANGE UP (ref 4.8–10.8)
WBC # FLD AUTO: 10.43 K/UL — SIGNIFICANT CHANGE UP (ref 4.8–10.8)

## 2025-02-10 PROCEDURE — 36415 COLL VENOUS BLD VENIPUNCTURE: CPT

## 2025-02-10 PROCEDURE — 85027 COMPLETE CBC AUTOMATED: CPT

## 2025-02-10 PROCEDURE — 99222 1ST HOSP IP/OBS MODERATE 55: CPT | Mod: FS

## 2025-02-10 PROCEDURE — 71045 X-RAY EXAM CHEST 1 VIEW: CPT | Mod: 26

## 2025-02-10 PROCEDURE — 80048 BASIC METABOLIC PNL TOTAL CA: CPT

## 2025-02-10 PROCEDURE — 83735 ASSAY OF MAGNESIUM: CPT

## 2025-02-10 PROCEDURE — 99285 EMERGENCY DEPT VISIT HI MDM: CPT | Mod: FS

## 2025-02-10 PROCEDURE — 85652 RBC SED RATE AUTOMATED: CPT

## 2025-02-10 PROCEDURE — 94640 AIRWAY INHALATION TREATMENT: CPT

## 2025-02-10 PROCEDURE — 80061 LIPID PANEL: CPT

## 2025-02-10 PROCEDURE — 78582 LUNG VENTILAT&PERFUS IMAGING: CPT | Mod: MC

## 2025-02-10 PROCEDURE — 99497 ADVNCD CARE PLAN 30 MIN: CPT | Mod: 25

## 2025-02-10 PROCEDURE — 84145 PROCALCITONIN (PCT): CPT

## 2025-02-10 PROCEDURE — 71250 CT THORAX DX C-: CPT | Mod: 26

## 2025-02-10 PROCEDURE — A9540: CPT

## 2025-02-10 PROCEDURE — A9567: CPT

## 2025-02-10 PROCEDURE — 86140 C-REACTIVE PROTEIN: CPT

## 2025-02-10 RX ORDER — ACETAMINOPHEN 160 MG/5ML
975 SUSPENSION ORAL ONCE
Refills: 0 | Status: COMPLETED | OUTPATIENT
Start: 2025-02-10 | End: 2025-02-10

## 2025-02-10 RX ORDER — ROSUVASTATIN CALCIUM 10 MG/1
1 TABLET, FILM COATED ORAL
Refills: 0 | DISCHARGE

## 2025-02-10 RX ORDER — ROSUVASTATIN CALCIUM 10 MG/1
20 TABLET, FILM COATED ORAL AT BEDTIME
Refills: 0 | Status: DISCONTINUED | OUTPATIENT
Start: 2025-02-10 | End: 2025-02-12

## 2025-02-10 RX ORDER — IPRATROPIUM BROMIDE AND ALBUTEROL SULFATE .5; 2.5 MG/3ML; MG/3ML
3 SOLUTION RESPIRATORY (INHALATION) ONCE
Refills: 0 | Status: COMPLETED | OUTPATIENT
Start: 2025-02-10 | End: 2025-02-10

## 2025-02-10 RX ORDER — IPRATROPIUM BROMIDE AND ALBUTEROL SULFATE .5; 2.5 MG/3ML; MG/3ML
3 SOLUTION RESPIRATORY (INHALATION) EVERY 6 HOURS
Refills: 0 | Status: DISCONTINUED | OUTPATIENT
Start: 2025-02-10 | End: 2025-02-11

## 2025-02-10 RX ORDER — ALBUTEROL 90 MCG
2 AEROSOL REFILL (GRAM) INHALATION EVERY 6 HOURS
Refills: 0 | Status: DISCONTINUED | OUTPATIENT
Start: 2025-02-10 | End: 2025-02-12

## 2025-02-10 RX ORDER — DEXTROMETHORPHAN HBR AND GUAIFENESIN ORAL SOLUTION 10; 100 MG/5ML; MG/5ML
10 LIQUID ORAL EVERY 6 HOURS
Refills: 0 | Status: DISCONTINUED | OUTPATIENT
Start: 2025-02-10 | End: 2025-02-12

## 2025-02-10 RX ORDER — SODIUM CHLORIDE 9 G/ML
1000 INJECTION, SOLUTION INTRAVENOUS ONCE
Refills: 0 | Status: COMPLETED | OUTPATIENT
Start: 2025-02-10 | End: 2025-02-10

## 2025-02-10 RX ORDER — ONDANSETRON 4 MG/1
4 TABLET, ORALLY DISINTEGRATING ORAL EVERY 8 HOURS
Refills: 0 | Status: DISCONTINUED | OUTPATIENT
Start: 2025-02-10 | End: 2025-02-12

## 2025-02-10 RX ORDER — LEVOTHYROXINE SODIUM 25 UG/1
88 TABLET ORAL DAILY
Refills: 0 | Status: DISCONTINUED | OUTPATIENT
Start: 2025-02-10 | End: 2025-02-12

## 2025-02-10 RX ORDER — ACETAMINOPHEN, DIPHENHYDRAMINE HCL, PHENYLEPHRINE HCL 325; 25; 5 MG/1; MG/1; MG/1
3 TABLET ORAL AT BEDTIME
Refills: 0 | Status: DISCONTINUED | OUTPATIENT
Start: 2025-02-10 | End: 2025-02-12

## 2025-02-10 RX ORDER — ENOXAPARIN SODIUM 100 MG/ML
40 INJECTION SUBCUTANEOUS EVERY 24 HOURS
Refills: 0 | Status: DISCONTINUED | OUTPATIENT
Start: 2025-02-10 | End: 2025-02-12

## 2025-02-10 RX ORDER — ONDANSETRON 4 MG/1
4 TABLET, ORALLY DISINTEGRATING ORAL ONCE
Refills: 0 | Status: COMPLETED | OUTPATIENT
Start: 2025-02-10 | End: 2025-02-10

## 2025-02-10 RX ORDER — MAGNESIUM, ALUMINUM HYDROXIDE 200-225/5
30 SUSPENSION, ORAL (FINAL DOSE FORM) ORAL EVERY 4 HOURS
Refills: 0 | Status: DISCONTINUED | OUTPATIENT
Start: 2025-02-10 | End: 2025-02-12

## 2025-02-10 RX ADMIN — ONDANSETRON 4 MILLIGRAM(S): 4 TABLET, ORALLY DISINTEGRATING ORAL at 18:01

## 2025-02-10 RX ADMIN — SODIUM CHLORIDE 1000 MILLILITER(S): 9 INJECTION, SOLUTION INTRAVENOUS at 15:35

## 2025-02-10 RX ADMIN — IPRATROPIUM BROMIDE AND ALBUTEROL SULFATE 3 MILLILITER(S): .5; 2.5 SOLUTION RESPIRATORY (INHALATION) at 13:59

## 2025-02-10 RX ADMIN — ACETAMINOPHEN 975 MILLIGRAM(S): 160 SUSPENSION ORAL at 18:01

## 2025-02-10 RX ADMIN — SODIUM CHLORIDE 1000 MILLILITER(S): 9 INJECTION, SOLUTION INTRAVENOUS at 18:01

## 2025-02-10 RX ADMIN — ROSUVASTATIN CALCIUM 20 MILLIGRAM(S): 10 TABLET, FILM COATED ORAL at 22:42

## 2025-02-10 NOTE — ED ADULT NURSE NOTE - NS ED PATIENT SAFETY CONCERN
Chief Complaint   Patient presents with   • Follow-up       HISTORY OF PRESENT ILLNESS:  Cris Cotton is a 75 year old female who is being seen for follow-up of chronic kidney disease. She has a history of Ménière's disease which is easily and influence by consuming increasing salty foods. She's had injections to her SI joint with improvement only last short period she otherwise denies any shortness of breath or chest pains denies palpitations. She's had no change in bowel habits or urinary symptoms. Her sleep is okay if she has the injection. Reflux is controlled. She has history of hypertension but also has a history of minor hypercalcemia with a inappropriate elevated PTH.      Patient Active Problem List   Diagnosis   • Esophageal reflux   • Migraine, unspecified, without mention of intractable migraine   • Rhinitis   • Polyp of nasal cavity, current resolution   • Meniere's disease   • Malignant neoplasm of breast (female), unspecified site   • Recurrent sinus infections, improved with nasal hygiene   • Dry eyes   • Primary osteoarthritis of both first carpometacarpal joints   • Primary osteoarthritis of both hands   • Primary osteoarthritis of both knees   • Bilateral hand pain   • Situational anxiety   • CKD (chronic kidney disease) stage 3, GFR 30-59 ml/min   • History of syncope       REVIEW OF SYSTEMS: Also has a history of hypertension.    I have reviewed the patient's medications and allergies, past medical, surgical and family history, updating these as appropriate. See histories section of the EMR for display of this information.    MEDICATIONS:  Current Outpatient Prescriptions   Medication Sig Dispense Refill   • metoPROLOL succinate (TOPROL-XL) 50 MG 24 hr tablet TAKE ONE TABLET BY MOUTH EVERY DAY 90 tablet 1   • famotidine (PEPCID) 40 MG tablet TAKE ONE TABLET BY MOUTH EVERY DAY 90 tablet 3   • gabapentin (NEURONTIN) 300 MG capsule Take 1 capsule by mouth nightly. 90 capsule 3   • citalopram  Isha is a 25 year old year old female, , here for an OB visit. Gestational Age 20w3d; TRAVON 2020. Pt complains of burning around clitoris.  States OTC monostat does help at times.  Did take Keflex for presumed UTI. Denies history of STIs.  She denies  contractions, leakage of fluid, vaginal bleeding, dysuria, abnormal vaginal discharge or edema. Reports good fetal movement.    PNC:    * anatomy scan incomplete due to fetal position- follow up ordered for 24 weeks  * Affirm collected    PELVIC EXAM:  Vulva: labia minora -erythema with slight excoriation on inside of labia minora mostly on clitoral flores  Uterus: appropriate size for gestational age  Adnexa: not evaluated      Danger S/S and FMC reviewed; contact the office with any concerns.  RTC in 4 week(s) or sooner if needed.    All of the patient’s questions were answered; she verbalizes understanding and is in agreement with the above mentioned plan.  She is certainly encouraged to call my office should any questions or concerns develop prior to her follow up appointment.    GAIL Stanford     (CELEXA) 20 MG tablet Take 1 tablet by mouth daily. 90 tablet 3   • montelukast (SINGULAIR) 10 MG tablet TAKE 2 TABLETS BY MOUTH ONCE NIGHTLY (Patient taking differently: 1 tablet nightly) 180 tablet 3   • Calcium Carb-Cholecalciferol (CALCIUM + D3) 600-200 MG-UNIT TABS Take 1 capsule by mouth daily.     • diclofenac (VOLTAREN) 1 % gel Apply to thumb BID prn 300 g 0   • diphenoxylate-atropine (LOMOTIL) 2.5-0.025 MG tablet TAKE ONE TABLET BY MOUTH FOUR TIMES DAILY AS NEEDED 30 tablet 3     No current facility-administered medications for this visit.        Medications Discontinued During This Encounter   Medication Reason   • diclofenac (VOLTAREN) 1 % gel Reorder         PHYSICAL EXAMINATION:   Visit Vitals  /72   Pulse 64   Resp 16   Ht 5' 4\" (1.626 m)   Wt 79.8 kg Comment: stated   BMI 30.21 kg/m²     Appears to be of stated age. In no acute distress and in good health.  HEENT: Normocephalic, atraumatic. Neck supple and free of any masses. The thyroid is non-palpable.  LUNGS: Clear to auscultation anteriorly and posteriorly. Respiratory effort is normal.  CARDIAC: Regular rate and rhythm and without murmurs, gallops or rubs.  EXTREMITIES: Without edema.    RESULTS:  Pending    ASSESSMENT AND PLAN:  1. Chronic kidney disease stage III that will be reevaluated.  2. Ménière's disease relatively stable she monitors her diet.  3. Gastroesophageal reflux controlled.  4. Hypertension controlled.  5. Question of hyperparathyroidism? That will be repeated before the next visit.  6. Migraine headaches controlled.  Return in about 6 months (around 12/11/2018) for [facility].     No

## 2025-02-10 NOTE — PATIENT PROFILE ADULT - HAS THE PATIENT USED TOBACCO IN THE PAST 30 DAYS?
Increase fluid intake and monitor for signs of dehydration including dark colored urine, weakness, lethargy, dizziness, etc.   Get plenty of rest.   BRAT Diet: Begin eating a BRAT diet as tolerated (bananas, plain rice, apple sauce, plain toast).  Fever / Body Aches: Take OTC Tylenol or Motrin per package instructions as needed.   Diarrhea: Take OTC Imodium per package instructions as needed for non-bloody diarrhea.   Follow-up with your Primary Care Provider as needed.   Present to the nearest Emergency Department with any significant change or worsening symptoms.     No

## 2025-02-10 NOTE — H&P ADULT - NSHPPHYSICALEXAM_GEN_ALL_CORE
VITALS:   T(C): 38.6 (02-10-25 @ 16:49), Max: 38.6 (02-10-25 @ 16:49)  HR: 71 (02-10-25 @ 16:49) (61 - 71)  BP: 143/60 (02-10-25 @ 16:49) (138/78 - 143/60)  RR: 20 (02-10-25 @ 13:02) (20 - 20)  SpO2: 94% (02-10-25 @ 13:02) (94% - 94%)    GENERAL: NAD, lying in bed comfortably  HEAD:  Atraumatic, normocephalic  EYES: EOMI, PERRLA, conjunctiva and sclera clear  ENT: + dry mucus membranes  NECK: Supple, no JVD  HEART: Regular rate and rhythm, no murmurs, rubs, or gallops  LUNGS: Unlabored respirations.  Clear to auscultation bilaterally, no crackles, wheezing, or rhonchi  ABDOMEN: Soft, nontender, nondistended, +BS  EXTREMITIES: 2+ peripheral pulses bilaterally. No clubbing, cyanosis, or edema  NERVOUS SYSTEM:  A&Ox3, no focal deficits   SKIN: No rashes or lesions VITALS:   T(C): 38.6 (02-10-25 @ 16:49), Max: 38.6 (02-10-25 @ 16:49)  HR: 71 (02-10-25 @ 16:49) (61 - 71)  BP: 143/60 (02-10-25 @ 16:49) (138/78 - 143/60)  RR: 20 (02-10-25 @ 13:02) (20 - 20)  SpO2: 94% (02-10-25 @ 13:02) (94% - 94%)    GENERAL: NAD, lying in bed comfortably  HEAD:  Atraumatic, normocephalic  EYES: EOMI, PERRLA, conjunctiva and sclera clear  ENT: + dry mucus membranes  NECK: Supple, no JVD  HEART: Regular rate and rhythm, no murmurs, rubs, or gallops  LUNGS: Unlabored respirations.  + rhonchi with good air entry  ABDOMEN: Soft, nontender, nondistended, +BS  EXTREMITIES: 2+ peripheral pulses bilaterally. No clubbing, cyanosis, or edema  NERVOUS SYSTEM:  A&Ox3, no focal deficits   SKIN: No rashes or lesions

## 2025-02-10 NOTE — ED PROVIDER NOTE - PHYSICAL EXAMINATION
VITAL SIGNS: I have reviewed nursing notes and confirm.  CONSTITUTIONAL: 74 yo F laying on stretcher; in no acute distress.  SKIN: Skin exam is warm and dry, no acute rash.  HEAD: Normocephalic; atraumatic.  EYES: Conjunctiva and sclera clear.  ENT: No nasal discharge; airway clear.  CARD: S1, S2 normal; no murmurs, gallops, or rubs. Regular rate and rhythm.  RESP: No wheezes, rales or rhonchi. Speaking in full sentences.   ABD: Normal bowel sounds; soft; non-distended; non-tender; No rebound or guarding. No CVA tenderness.  EXT: Normal ROM. No clubbing, cyanosis or edema. No calf TTP or swelling.   NEURO: Alert, oriented. Grossly unremarkable. No focal deficits.

## 2025-02-10 NOTE — PATIENT PROFILE ADULT - FALL HARM RISK - UNIVERSAL INTERVENTIONS
Bed in lowest position, wheels locked, appropriate side rails in place/Call bell, personal items and telephone in reach/Instruct patient to call for assistance before getting out of bed or chair/Non-slip footwear when patient is out of bed/Freer to call system/Physically safe environment - no spills, clutter or unnecessary equipment/Purposeful Proactive Rounding/Room/bathroom lighting operational, light cord in reach

## 2025-02-10 NOTE — ED ADULT TRIAGE NOTE - CHIEF COMPLAINT QUOTE
pt BIBA via [facility] from [facility] for cough congestion negative RVP swab, but found hypoxic 90s on RA

## 2025-02-10 NOTE — ED PROVIDER NOTE - CLINICAL SUMMARY MEDICAL DECISION MAKING FREE TEXT BOX
75-year-old female for eval of hypoxia shortness breath cough.  X-ray demonstrated bilateral  opacities patient started antibiotics admitted for further evaluation.

## 2025-02-10 NOTE — H&P ADULT - ASSESSMENT
#PNA   #Hx COPD   CT chest +  1.5 x 1.6 cm right upper lobe peripheral spiculated nodule worrisome for   malignancy. Recommend PET/CT/tissue sampling.    Moderate centrilobular emphysema  Xray   Blood/ sputum culture   Levaquin course   inflammatory markers / MRSA / legionella / strep   pulmonary consulted   O2 therapy as indicated     #Hemolyzed K   K 5.8, repeat pending      76 yo old female w PMHx as reviewed in the EMR who presented to the ED from  for hypoxia SOB and cough.     #Acute hypoxic respiratory failure   #PNA   #pulmonary nodules on CT chest   #Hx COPD   CT chest +  multiple RUL RML RLL & LLL pulmonary nodules; Mod centrilobular emphysema  Xray + lung base opacities   Blood/ sputum culture   Levaquin course   inflammatory markers / MRSA / legionella / strep   pulmonary consulted   duonebs PRN   O2 therapy w goal sats 88-92%     #Hemolyzed K   K 5.8, repeat pending     #Hx hypothyroidism   resume synthroid     Code Status: Full ACLS   DVT ppx: lovenox  76 yo old female w PMHx as reviewed in the EMR who presented to the ED from  for hypoxia SOB and cough.     #Acute hypoxic respiratory failure   #PNA   #pulmonary nodules on CT chest   #Hx COPD   CT chest +  multiple RUL RML RLL & LLL pulmonary nodules; Mod centrilobular emphysema  Xray + lung base opacities   Blood/ sputum culture   Levaquin course   inflammatory markers / MRSA / legionella / strep   pulmonary consulted   duonebs PRN   O2 therapy w goal sats 88-92%     #Hemolyzed K   K 5.8, repeat pending     #Hx hypothyroidism   resume synthroid     Code Status: Full ACLS   DVT ppx: lovenox   #Progress Note Handoff  Pending (specify):  as above  Family discussion:  plan of care was discussed with patient  in details.  all questions were answered.  seems to understand, and in agreement  Disposition:  home

## 2025-02-10 NOTE — ED ADULT NURSE NOTE - CHIEF COMPLAINT QUOTE
pt BIBA via [facility] from [facility] for cough congestion negative RVP swab, but found hypoxic 90s on RA

## 2025-02-10 NOTE — H&P ADULT - CONVERSATION DETAILS
Full ACLS Full ACLS    Current plan of care, and prognosis were discussed with patient in details, all option were discussed in details  including CPR procedure, shock procedure, and intubation procedure.   Explained that duration of machines/inbutaion/pressor are unknown, and they are based on the underline issue.   patient opted for full code with full understanding of what it involves in details  time 30min

## 2025-02-10 NOTE — ED PROVIDER NOTE - OBJECTIVE STATEMENT
75-year-old female with past medical history of HLD, GERD, and hypothyroidism presents to the ED c/o persisting cough, SOB, generalized weakness x 1 week with development of fever last night. Pt saw PMD on 2/4, was started on medrol dosepack and z-power but has had no improvement in symptoms. She admits to associated nausea. She went to [facility] today and was found to be hypoxic to mid-80s. She denies other complaints. Pt denies vomiting, abdominal pain, diarrhea, headache, dizziness, chest pain, back pain, LOC, trauma, urinary symptoms, calf pain/swelling, recent travel, recent surgery.

## 2025-02-10 NOTE — ED ADULT NURSE NOTE - NSICDXPASTSURGICALHX_GEN_ALL_CORE_FT
PAST SURGICAL HISTORY:  History of cataract surgery     S/P breast lumpectomy left    S/P cholecystectomy

## 2025-02-10 NOTE — H&P ADULT - HISTORY OF PRESENT ILLNESS
75-year-old female with PMHx significant for HLD, GERD, and hypothyroidism along w familial hx of lung cancer who  presented to the ED at the prompting of . Patient was noted to be hypoxic into the 80s, thus sent here via EMS. Patient reports persistent cough, SOB, generalized weakness x 1 week with development of subjective fever last night. 2/4/24 patient prescribed a dose pack and z-power but has had no improvement in symptoms. Further reports noticing intermittent SOB w routine ambulation activities earlier in the month.  Symptoms associated w intermittent nausea. On exam patient denied nausea vomiting fever chills; denied HA dizziness lightheadedness; denied CP palpitations and generalized body aches.     ED work up notable for CT A/P + for  1.5 x 1.6 cm RUL  peripheral spiculated nodule; Moderate centrilobular emphysema; RML 1.7 cm cyst; ill defined bibasilar opacities; 6 & 7 mm RLL/LLL pulmonary nodules; and  Mild left hepatic lobe biliary ductal dilatation. Xray + patchy opacities lung bases. Viral panel neg. Febrile on arrival, now normal. Labs actionable for hyperkalemia 5.8, repeat pending given hemolyzed.  Will admit to hospital service for further medical workup and management.

## 2025-02-10 NOTE — H&P ADULT - NSHPLABSRESULTS_GEN_ALL_CORE
15.5   10.43 )-----------( 206      ( 10 Feb 2025 13:55 )             46.1       02-10    134[L]  |  97[L]  |  11  ----------------------------<  115[H]  5.8[H]   |  26  |  0.9    Ca    8.6      10 Feb 2025 13:55  Mg     1.9     02-10    TPro  6.9  /  Alb  4.0  /  TBili  1.0  /  DBili  x   /  AST  70[H]  /  ALT  36  /  AlkPhos  72  02-10          Magnesium: 1.9 mg/dL (02-10-25 @ 13:55)          Urinalysis Basic - ( 10 Feb 2025 13:55 )    Color: x / Appearance: x / SG: x / pH: x  Gluc: 115 mg/dL / Ketone: x  / Bili: x / Urobili: x   Blood: x / Protein: x / Nitrite: x   Leuk Esterase: x / RBC: x / WBC x   Sq Epi: x / Non Sq Epi: x / Bacteria: x            Lactate Trend  02-10 @ 13:55 Lactate:0.9

## 2025-02-11 LAB
ANION GAP SERPL CALC-SCNC: 11 MMOL/L — SIGNIFICANT CHANGE UP (ref 7–14)
BUN SERPL-MCNC: 9 MG/DL — LOW (ref 10–20)
CALCIUM SERPL-MCNC: 8.4 MG/DL — SIGNIFICANT CHANGE UP (ref 8.4–10.5)
CHLORIDE SERPL-SCNC: 102 MMOL/L — SIGNIFICANT CHANGE UP (ref 98–110)
CHOLEST SERPL-MCNC: 123 MG/DL — SIGNIFICANT CHANGE UP
CO2 SERPL-SCNC: 26 MMOL/L — SIGNIFICANT CHANGE UP (ref 17–32)
CREAT SERPL-MCNC: 0.9 MG/DL — SIGNIFICANT CHANGE UP (ref 0.7–1.5)
EGFR: 68 ML/MIN/1.73M2 — SIGNIFICANT CHANGE UP
GLUCOSE SERPL-MCNC: 98 MG/DL — SIGNIFICANT CHANGE UP (ref 70–99)
HCT VFR BLD CALC: 41.4 % — SIGNIFICANT CHANGE UP (ref 37–47)
HDLC SERPL-MCNC: 54 MG/DL — SIGNIFICANT CHANGE UP
HGB BLD-MCNC: 14.1 G/DL — SIGNIFICANT CHANGE UP (ref 12–16)
LIPID PNL WITH DIRECT LDL SERPL: 53 MG/DL — SIGNIFICANT CHANGE UP
MAGNESIUM SERPL-MCNC: 1.8 MG/DL — SIGNIFICANT CHANGE UP (ref 1.8–2.4)
MCHC RBC-ENTMCNC: 29.9 PG — SIGNIFICANT CHANGE UP (ref 27–31)
MCHC RBC-ENTMCNC: 34.1 G/DL — SIGNIFICANT CHANGE UP (ref 32–37)
MCV RBC AUTO: 87.7 FL — SIGNIFICANT CHANGE UP (ref 81–99)
NON HDL CHOLESTEROL: 69 MG/DL — SIGNIFICANT CHANGE UP
NRBC # BLD: 0 /100 WBCS — SIGNIFICANT CHANGE UP (ref 0–0)
NRBC BLD-RTO: 0 /100 WBCS — SIGNIFICANT CHANGE UP (ref 0–0)
PLATELET # BLD AUTO: 196 K/UL — SIGNIFICANT CHANGE UP (ref 130–400)
PMV BLD: 9.7 FL — SIGNIFICANT CHANGE UP (ref 7.4–10.4)
POTASSIUM SERPL-MCNC: 4.9 MMOL/L — SIGNIFICANT CHANGE UP (ref 3.5–5)
POTASSIUM SERPL-SCNC: 4.9 MMOL/L — SIGNIFICANT CHANGE UP (ref 3.5–5)
PROCALCITONIN SERPL-MCNC: 0.08 NG/ML — SIGNIFICANT CHANGE UP (ref 0.02–0.1)
RBC # BLD: 4.72 M/UL — SIGNIFICANT CHANGE UP (ref 4.2–5.4)
RBC # FLD: 13.2 % — SIGNIFICANT CHANGE UP (ref 11.5–14.5)
SODIUM SERPL-SCNC: 139 MMOL/L — SIGNIFICANT CHANGE UP (ref 135–146)
TRIGL SERPL-MCNC: 79 MG/DL — SIGNIFICANT CHANGE UP
WBC # BLD: 9.7 K/UL — SIGNIFICANT CHANGE UP (ref 4.8–10.8)
WBC # FLD AUTO: 9.7 K/UL — SIGNIFICANT CHANGE UP (ref 4.8–10.8)

## 2025-02-11 PROCEDURE — 99232 SBSQ HOSP IP/OBS MODERATE 35: CPT

## 2025-02-11 PROCEDURE — 99223 1ST HOSP IP/OBS HIGH 75: CPT

## 2025-02-11 PROCEDURE — 78582 LUNG VENTILAT&PERFUS IMAGING: CPT | Mod: 26

## 2025-02-11 RX ORDER — PREDNISONE 5 MG/1
50 TABLET ORAL DAILY
Refills: 0 | Status: DISCONTINUED | OUTPATIENT
Start: 2025-02-11 | End: 2025-02-12

## 2025-02-11 RX ORDER — IPRATROPIUM BROMIDE AND ALBUTEROL SULFATE .5; 2.5 MG/3ML; MG/3ML
3 SOLUTION RESPIRATORY (INHALATION) EVERY 6 HOURS
Refills: 0 | Status: DISCONTINUED | OUTPATIENT
Start: 2025-02-11 | End: 2025-02-12

## 2025-02-11 RX ADMIN — IPRATROPIUM BROMIDE AND ALBUTEROL SULFATE 3 MILLILITER(S): .5; 2.5 SOLUTION RESPIRATORY (INHALATION) at 19:40

## 2025-02-11 RX ADMIN — ROSUVASTATIN CALCIUM 20 MILLIGRAM(S): 10 TABLET, FILM COATED ORAL at 23:14

## 2025-02-11 RX ADMIN — LEVOTHYROXINE SODIUM 88 MICROGRAM(S): 25 TABLET ORAL at 06:11

## 2025-02-11 RX ADMIN — DEXTROMETHORPHAN HBR AND GUAIFENESIN ORAL SOLUTION 10 MILLILITER(S): 10; 100 LIQUID ORAL at 15:14

## 2025-02-11 RX ADMIN — PREDNISONE 50 MILLIGRAM(S): 5 TABLET ORAL at 14:04

## 2025-02-11 RX ADMIN — ONDANSETRON 4 MILLIGRAM(S): 4 TABLET, ORALLY DISINTEGRATING ORAL at 00:06

## 2025-02-11 RX ADMIN — ONDANSETRON 4 MILLIGRAM(S): 4 TABLET, ORALLY DISINTEGRATING ORAL at 15:23

## 2025-02-11 NOTE — CONSULT NOTE ADULT - SUBJECTIVE AND OBJECTIVE BOX
HPI:  75-year-old female with PMHx significant for HLD, GERD, and hypothyroidism along w familial hx of lung cancer who  presented to the ED at the prompting of . Patient was noted to be hypoxic into the 80s, thus sent here via EMS. Patient reports persistent cough, SOB, generalized weakness x 1 week with development of subjective fever last night. 2/4/24 patient prescribed a dose pack and z-power but has had no improvement in symptoms. Further reports noticing intermittent SOB w routine ambulation activities earlier in the month.  Symptoms associated w intermittent nausea. On exam patient denied nausea vomiting fever chills; denied HA dizziness lightheadedness; denied CP palpitations and generalized body aches.     ED work up notable for CT A/P + for  1.5 x 1.6 cm RUL  peripheral spiculated nodule; Moderate centrilobular emphysema; RML 1.7 cm cyst; ill defined bibasilar opacities; 6 & 7 mm RLL/LLL pulmonary nodules; and  Mild left hepatic lobe biliary ductal dilatation. Xray + patchy opacities lung bases. Viral panel neg. Febrile on arrival, now normal. Labs actionable for hyperkalemia 5.8, repeat pending given hemolyzed.  Will admit to hospital service for further medical workup and management.  (10 Feb 2025 20:15)       I reviewed the radiology tests and hospital record including the ED chart.    I reviewed the other consultants comments that are available in the chart.    CC/ HPI Patient is a 75y old  Female who presents with a chief complaint of SOB and generalized weakness x 1 wk (10 Feb 2025 20:15)      Pneumonia due to infectious organism    RSD (reflex sympathetic dystrophy)    S/P cholecystectomy    Hernia    S/P breast lumpectomy    Pneumonia    S/P breast lumpectomy    S/P cholecystectomy    History of cataract surgery    FLU SYMPTOM    Pulmonary nodules    [id number]        FAMILY HISTORY:      PAST MEDICAL & SURGICAL HISTORY:  RSD (reflex sympathetic dystrophy)  LUE      Hernia  LIH      S/P breast lumpectomy  left      S/P cholecystectomy      History of cataract surgery          FAMILY HISTORY:      SOCIAL HISTORY:  Smoking: heavy in past  EtOH Use:  Marital Status:  Occupation:  Exposures:  Recent Travel:      penicillins (Short breath)  IV Contrast (Short breath)  Tylenol with Codeine (Nausea)      Soc:  see Hpi.    Home prescriptions  Crestor 20 mg oral tablet: 1 tab(s) orally once a day (at bedtime)  Synthroid 88 mcg (0.088 mg) oral tablet: 1 tab(s) orally once a day      MEDICATIONS  (STANDING):  enoxaparin Injectable 40 milliGRAM(s) SubCutaneous every 24 hours  levoFLOXacin IVPB 750 milliGRAM(s) IV Intermittent every 24 hours  levothyroxine 88 MICROGram(s) Oral daily  rosuvastatin 20 milliGRAM(s) Oral at bedtime    MEDICATIONS  (PRN):  albuterol    90 MICROgram(s) HFA Inhaler 2 Puff(s) Inhalation every 6 hours PRN Shortness of Breath and/or Wheezing  albuterol/ipratropium for Nebulization 3 milliLiter(s) Nebulizer every 6 hours PRN Shortness of Breath and/or Wheezing  aluminum hydroxide/magnesium hydroxide/simethicone Suspension 30 milliLiter(s) Oral every 4 hours PRN Dyspepsia  guaifenesin/dextromethorphan Oral Liquid 10 milliLiter(s) Oral every 6 hours PRN Cough  melatonin 3 milliGRAM(s) Oral at bedtime PRN Insomnia  ondansetron Injectable 4 milliGRAM(s) IV Push every 8 hours PRN Nausea and/or Vomiting      lactated ringers Bolus:   1000 milliLiter(s), IV Bolus, once, infuse over 60 Minute(s), Stop After 1 Doses  Provider's Contact #: [phone]  lactated ringers Bolus:   1000 milliLiter(s), IV Bolus, once, infuse over 60 Minute(s), Stop After 1 Doses  Provider's Contact #: [phone]      T(C): 36.8 (02-11-25 @ 05:12), Max: 38.6 (02-10-25 @ 16:49)  HR: 74 (02-11-25 @ 05:12) (61 - 74)  BP: 114/57 (02-11-25 @ 05:12) (114/57 - 143/60)  RR: 18 (02-11-25 @ 05:12) (18 - 20)  SpO2: 96% (02-11-25 @ 05:12) (94% - 97%)  ICU Vital Signs Last 24 Hrs  T(C): 36.8 (11 Feb 2025 05:12), Max: 38.6 (10 Feb 2025 16:49)  T(F): 98.3 (11 Feb 2025 05:12), Max: 101.4 (10 Feb 2025 16:49)  HR: 74 (11 Feb 2025 05:12) (61 - 74)  BP: 114/57 (11 Feb 2025 05:12) (114/57 - 143/60)  BP(mean): --  ABP: --  ABP(mean): --  RR: 18 (11 Feb 2025 05:12) (18 - 20)  SpO2: 96% (11 Feb 2025 05:12) (94% - 97%)    O2 Parameters below as of 11 Feb 2025 05:12  Patient On (Oxygen Delivery Method): nasal cannula  O2 Flow (L/min): 2          I&O's Summary      I&O's Detail      Drug Dosing Weight  Height (cm): 172.7 (10 Feb 2025 21:20)  Weight (kg): 66.7 (10 Feb 2025 21:20)  BMI (kg/m2): 22.4 (10 Feb 2025 21:20)  BSA (m2): 1.79 (10 Feb 2025 21:20)    LABS:                          15.5   10.43 )-----------( 206      ( 10 Feb 2025 13:55 )             46.1       02-10    134[L]  |  97[L]  |  11  ----------------------------<  115[H]  5.8[H]   |  26  |  0.9    Ca    8.6      10 Feb 2025 13:55  Mg     1.9     02-10    TPro  6.9  /  Alb  4.0  /  TBili  1.0  /  DBili  x   /  AST  70[H]  /  ALT  36  /  AlkPhos  72  02-10      LIVER FUNCTIONS - ( 10 Feb 2025 13:55 )  Alb: 4.0 g/dL / Pro: 6.9 g/dL / ALK PHOS: 72 U/L / ALT: 36 U/L / AST: 70 U/L / GGT: x                           Lactate, Blood: 0.9 mmol/L (02-10-25 @ 13:55)              Urinalysis Basic - ( 10 Feb 2025 13:55 )    Color: x / Appearance: x / SG: x / pH: x  Gluc: 115 mg/dL / Ketone: x  / Bili: x / Urobili: x   Blood: x / Protein: x / Nitrite: x   Leuk Esterase: x / RBC: x / WBC x   Sq Epi: x / Non Sq Epi: x / Bacteria: x          levoFLOXacin IVPB 750 milliGRAM(s) IV Intermittent every 24 hours                  RADIOLOGY:  I have personally reviewed all chest and other pertinent radiology films.      REVIEW OF SYSTEMS:   see Hpi.    PHYSICAL EXAM:       · ENMT:   Airway patent,   Nasal mucosa clear.  Mouth with normal mucosa.   No thrush    · EYES:   Clear bilaterally,   pupils equal,   round and reactive to light.    · CARDIAC:   Normal rate,   regular rhythm.    Heart sounds S1, S2.   no thrills or bruits on palpitation  normal  cardiac impulse  No murmurs, no rubs or gallops on auscultation  no edema        CAROTID:   normal systolic impulse  no bruits    · RESPIRATORY:   rales,   normal chest expansion  not tachypneic,  no retractions or use of accessory muscles  palpation of chest is normal with no fremitus  percussion of chest demonstrates no hyperresonance or dullness    · GASTROINTESTINAL:  Abdomen soft,   non-tender,   no guarding,   + BS  liver spleen not palpable      · SKIN:   Skin normal color for race,   warm,   dry and intact.       · HEME LYMPH:   no splenomegaly.  No cervical  lymphadenopathy.  no inguinal lymphadenopathy

## 2025-02-11 NOTE — CONSULT NOTE ADULT - ASSESSMENT
74 yo old female w PMHx as reviewed in the EMR who presented to the ED from Urgent care for hypoxia SOB and cough. Heme/Onc consulted for findings on CT spiculated lung nodule suspicious for malignancy      #PNA   #pulmonary nodules on CT chest r/o malignancy   #hx of COPD     --- CT chest w/o contrast 2.10.25:  1.5 x 1.6 cm right upper lobe peripheral spiculated nodule worrisome for   malignancy. Recommend PET/CT/tissue sampling.  Moderate centrilobular emphysema.  Right middle lobe 1.7 cm pulmonary cyst with smooth wall thickening,   nonspecific.  Bibasilar endobronchial debris with ill-defined bibasilar opacities which   could reflect atelectasis or developing aspiration pneumonia.  6 and 7 mm right lower lobe and left lower lobe pulmonary nodules.  Status post cholecystectomy. Mild left hepatic lobe biliary ductal   dilatation    Plan:   76 yo old female w PMHx as reviewed in the EMR who presented to the ED from Urgent care for hypoxia SOB and cough. Heme/Onc consulted for findings on CT spiculated lung nodule suspicious for malignancy      #PNA   #pulmonary nodules on CT chest r/o malignancy   #hx of COPD     --- CT chest w/o contrast 2.10.25:  1.5 x 1.6 cm right upper lobe peripheral spiculated nodule worrisome for   malignancy. Recommend PET/CT/tissue sampling.  Moderate centrilobular emphysema.  Right middle lobe 1.7 cm pulmonary cyst with smooth wall thickening,   nonspecific.  Bibasilar endobronchial debris with ill-defined bibasilar opacities which   could reflect atelectasis or developing aspiration pneumonia.  6 and 7 mm right lower lobe and left lower lobe pulmonary nodules.  Status post cholecystectomy. Mild left hepatic lobe biliary ductal   dilatation    Plan:  pt still needs to be seen, shes at radiology

## 2025-02-11 NOTE — CHART NOTE - NSCHARTNOTEFT_GEN_A_CORE
Spoke with pt and she wants to pursue onc care at [facility] - also spoke with Onc on medical floors - we will cancel consult.

## 2025-02-11 NOTE — PROGRESS NOTE ADULT - SUBJECTIVE AND OBJECTIVE BOX
Patient is a 75y old  Female who presents with a chief complaint of SOB and generalized weakness x 1 wk (02-11-25)      Pt seen and examined at bedside. No CP or SOB. pt off oxygen, feeling better           PAST MEDICAL & SURGICAL HISTORY:  RSD (reflex sympathetic dystrophy)  LUE    Hernia  LIH    S/P breast lumpectomy  left    S/P cholecystectomy    History of cataract surgery        VITAL SIGNS (Last 24 hrs):  T(C): 37.1 (02-11-25 @ 14:37), Max: 37.1 (02-11-25 @ 14:37)  HR: 76 (02-11-25 @ 14:37) (73 - 76)  BP: 118/72 (02-11-25 @ 14:37) (114/57 - 118/73)  RR: 18 (02-11-25 @ 05:12) (18 - 18)  SpO2: 93% (02-11-25 @ 14:37) (93% - 97%)  Wt(kg): --  Daily Height in cm: 172.72 (10 Feb 2025 21:20)    Daily     I&O's Summary      PHYSICAL EXAM:  GENERAL: NAD, well-developed  HEAD:  Atraumatic, Normocephalic  EYES: EOMI, PERRLA, conjunctiva and sclera clear  NECK: Supple, No JVD  CHEST/LUNG: Clear to auscultation bilaterally; No wheeze  HEART: Regular rate and rhythm; No murmurs, rubs, or gallops  ABDOMEN: Soft, Nontender, Nondistended; Bowel sounds present  EXTREMITIES:  2+ Peripheral Pulses, No clubbing, cyanosis, or edema  PSYCH: AAOx3  NEUROLOGY: non-focal  SKIN: No rashes or lesions    Labs Reviewed  Spoke to patient in regards to abnormal labs.    CBC Full  -  ( 11 Feb 2025 07:29 )  WBC Count : 9.70 K/uL  Hemoglobin : 14.1 g/dL  Hematocrit : 41.4 %  Platelet Count - Automated : 196 K/uL  Mean Cell Volume : 87.7 fL  Mean Cell Hemoglobin : 29.9 pg  Mean Cell Hemoglobin Concentration : 34.1 g/dL  Auto Neutrophil # : x  Auto Lymphocyte # : x  Auto Monocyte # : x  Auto Eosinophil # : x  Auto Basophil # : x  Auto Neutrophil % : x  Auto Lymphocyte % : x  Auto Monocyte % : x  Auto Eosinophil % : x  Auto Basophil % : x    BMP:    02-11 @ 07:29    Blood Urea Nitrogen - 9  Calcium - 8.4  Carbond Dioxide - 26  Chloride - 102  Creatinine - 0.9  Glucose - 98  Potassium - 4.9  Sodium - 139      Hemoglobin A1c -     Urine Culture:        COVID Labs  CRP:  43.5 (02-10-25)    Procalcitonin: 0.08 ng/mL (02-10-25 @ 21:00)    D-Dimer:            Imaging reviewed independently and reviewed read        MEDICATIONS  (STANDING):  albuterol/ipratropium for Nebulization 3 milliLiter(s) Nebulizer every 6 hours  enoxaparin Injectable 40 milliGRAM(s) SubCutaneous every 24 hours  levoFLOXacin IVPB 750 milliGRAM(s) IV Intermittent every 24 hours  levothyroxine 88 MICROGram(s) Oral daily  predniSONE   Tablet 50 milliGRAM(s) Oral daily  rosuvastatin 20 milliGRAM(s) Oral at bedtime    MEDICATIONS  (PRN):  albuterol    90 MICROgram(s) HFA Inhaler 2 Puff(s) Inhalation every 6 hours PRN Shortness of Breath and/or Wheezing  aluminum hydroxide/magnesium hydroxide/simethicone Suspension 30 milliLiter(s) Oral every 4 hours PRN Dyspepsia  guaifenesin/dextromethorphan Oral Liquid 10 milliLiter(s) Oral every 6 hours PRN Cough  melatonin 3 milliGRAM(s) Oral at bedtime PRN Insomnia  ondansetron Injectable 4 milliGRAM(s) IV Push every 8 hours PRN Nausea and/or Vomiting

## 2025-02-11 NOTE — CONSULT NOTE ADULT - SUBJECTIVE AND OBJECTIVE BOX
Heme/Onc Consult Note    HPI:  75-year-old female with PMHx significant for HLD, GERD, and hypothyroidism along w familial hx of lung cancer who  presented to the ED at the prompting of . Patient was noted to be hypoxic into the 80s, thus sent here via EMS. Patient reports persistent cough, SOB, generalized weakness x 1 week with development of subjective fever last night. 2/4/24 patient prescribed a dose pack and z-power but has had no improvement in symptoms. Further reports noticing intermittent SOB w routine ambulation activities earlier in the month.  Symptoms associated w intermittent nausea. On exam patient denied nausea vomiting fever chills; denied HA dizziness lightheadedness; denied CP palpitations and generalized body aches.     ED work up notable for CT A/P + for  1.5 x 1.6 cm RUL  peripheral spiculated nodule; Moderate centrilobular emphysema; RML 1.7 cm cyst; ill defined bibasilar opacities; 6 & 7 mm RLL/LLL pulmonary nodules; and  Mild left hepatic lobe biliary ductal dilatation. Xray + patchy opacities lung bases. Viral panel neg. Febrile on arrival, now normal. Labs actionable for hyperkalemia 5.8, repeat pending given hemolyzed.  Will admit to hospital service for further medical workup and management.  (10 Feb 2025 20:15)      Allergies    penicillins (Short breath)  IV Contrast (Short breath)  Tylenol with Codeine (Nausea)    Intolerances        MEDICATIONS  (STANDING):  enoxaparin Injectable 40 milliGRAM(s) SubCutaneous every 24 hours  levoFLOXacin IVPB 750 milliGRAM(s) IV Intermittent every 24 hours  levothyroxine 88 MICROGram(s) Oral daily  predniSONE   Tablet 50 milliGRAM(s) Oral daily  rosuvastatin 20 milliGRAM(s) Oral at bedtime    MEDICATIONS  (PRN):  albuterol    90 MICROgram(s) HFA Inhaler 2 Puff(s) Inhalation every 6 hours PRN Shortness of Breath and/or Wheezing  albuterol/ipratropium for Nebulization 3 milliLiter(s) Nebulizer every 6 hours PRN Shortness of Breath and/or Wheezing  aluminum hydroxide/magnesium hydroxide/simethicone Suspension 30 milliLiter(s) Oral every 4 hours PRN Dyspepsia  guaifenesin/dextromethorphan Oral Liquid 10 milliLiter(s) Oral every 6 hours PRN Cough  melatonin 3 milliGRAM(s) Oral at bedtime PRN Insomnia  ondansetron Injectable 4 milliGRAM(s) IV Push every 8 hours PRN Nausea and/or Vomiting      PAST MEDICAL & SURGICAL HISTORY:  RSD (reflex sympathetic dystrophy)  LUE      Hernia  LIH      S/P breast lumpectomy  left      S/P cholecystectomy      History of cataract surgery          FAMILY HISTORY:      SOCIAL HISTORY: No EtOH, no tobacco    REVIEW OF SYSTEMS:    CONSTITUTIONAL: No weakness, fevers or chills  EYES/ENT: No visual changes;  No vertigo or throat pain   NECK: No pain or stiffness  RESPIRATORY: No cough, wheezing, hemoptysis; No shortness of breath  CARDIOVASCULAR: No chest pain or palpitations  GASTROINTESTINAL: No abdominal or epigastric pain. No nausea, vomiting, or hematemesis; No diarrhea or constipation. No melena or hematochezia.  GENITOURINARY: No dysuria, frequency or hematuria  NEUROLOGICAL: No numbness or weakness  SKIN: No itching, burning, rashes, or lesions   All other review of systems is negative unless indicated above.    Height (cm): 172.7 (02-10 @ 21:20)  Weight (kg): 66.7 (02-10 @ 21:20)  BMI (kg/m2): 22.4 (02-10 @ 21:20)  BSA (m2): 1.79 (02-10 @ 21:20)    T(F): 98.3 (02-11-25 @ 05:12), Max: 101.4 (02-10-25 @ 16:49)  HR: 74 (02-11-25 @ 05:12)  BP: 114/57 (02-11-25 @ 05:12)  RR: 18 (02-11-25 @ 05:12)  SpO2: 96% (02-11-25 @ 05:12)  Wt(kg): --    GENERAL: NAD, well-developed  HEAD:  Atraumatic, Normocephalic  EYES: EOMI, PERRLA, conjunctiva and sclera clear  NECK: Supple, No JVD  CHEST/LUNG: Clear to auscultation bilaterally; No wheeze  HEART: Regular rate and rhythm; No murmurs, rubs, or gallops  ABDOMEN: Soft, Nontender, Nondistended; Bowel sounds present  EXTREMITIES:  2+ Peripheral Pulses, No clubbing, cyanosis, or edema  NEUROLOGY: non-focal  SKIN: No rashes or lesions                          14.1   9.70  )-----------( 196      ( 11 Feb 2025 07:29 )             41.4       02-11    139  |  102  |  9[L]  ----------------------------<  98  4.9   |  26  |  0.9    Ca    8.4      11 Feb 2025 07:29  Mg     1.8     02-11    TPro  6.9  /  Alb  4.0  /  TBili  1.0  /  DBili  x   /  AST  70[H]  /  ALT  36  /  AlkPhos  72  02-10      Magnesium: 1.8 mg/dL (02-11 @ 07:29)  Magnesium: 1.9 mg/dL (02-10 @ 13:55)

## 2025-02-11 NOTE — PROGRESS NOTE ADULT - ASSESSMENT
74 yo old female w PMHx as reviewed in the EMR who presented to the ED from  for hypoxia SOB and cough.     #Acute hypoxic respiratory failure   #PNA   #pulmonary nodules on CT chest   #spiculated mass suspicious for malignancy  #Hx COPD   CT chest + multiple RUL RML RLL & LLL pulmonary nodules; Mod centrilobular emphysema  Xray + lung base opacities   Blood/ sputum culture   Levaquin course   inflammatory markers / MRSA / legionella / strep   pulmonary consulted -> they made pt aware of spiculated mass - will need PET scan outpatient with them  duonebs q6  continue inhalers   O2 therapy w goal sats 88-92%   V/Q scan negative for PE    #Hemolyzed K   K 5.8, repeat stable     #Hx hypothyroidism   resume synthroid     Code Status: Full ACLS   DVT ppx: lovenox   Disposition:  home    HANDOFF: outpt pulm follow up for PET scan for spiculated mass, pt wants to go to [facility] for hemeonc care. DC 2/12 with prednisone for home, duoneb, nebulizer, LABA/ICA

## 2025-02-11 NOTE — CONSULT NOTE ADULT - ASSESSMENT
Impression:  Multilobar pneumonia  Possible aspiration  Likely due to GNR given age and comorbidities  Acute respiratory failure due to COPD with exacerbation  hypoxia  lung nodule RUL c/w malignancy  bibasilar bronchiectasis with mucous plugging  mult pulm nodules in bases  cyst RUL non specific    Suggest:  Check O2 sat on NC, record, continue O2 as necessary to maintain sats > 90%  albuterol/ipratropium for Nebulization 3 milliLiter(s) Nebulizer every 6 hours  levoFLOXacin IVPB 750 milliGRAM(s) IV Intermittent every 24 hours  methylPREDNISolone sodium succinate Injectable 40 milliGRAM(s) IV Push every 8hours  AVAPS PRN.  , EPAP 8, min IPAP 14, max IPAP 25. O2 to keep Faiza@ >92%.  obtain full cultures sputum gm stain CS  check full Respiratory viral panel, Strep and Legionella Ag.   Nasal MRSA  OOB to chair  GI and DVT prophylaxis  pulmonary toilet to prevent aspirations  off loading of skin to prevent pressure ulcers  Monitor clinically, convert to oral prednisone as clinical improvement allows  Upon D/C the patient will need ICS/LABA, LAMA, PRN albuterol, finish abx, slow taper of steroids ie. start Prednisone 40 mg and taper 10 mg Q4d.  Pt to see me in the office to arrange the following once the infection has cleared  Pt will need OP PET, followed by bx by IR,   full PFT to see if she is a candidate for lung resection if Bx positive  Repeat CT in several months to monitor the other nodules           Impression:  Multilobar pneumonia  Possible aspiration  Likely due to GNR given age and comorbidities  Acute respiratory failure due to COPD with exacerbation  hypoxia  lung nodule RUL c/w malignancy, mother and brother had lung cancer  bibasilar bronchiectasis with mucous plugging  mult pulm nodules in bases  cyst RUL non specific    Suggest:  Check O2 sat on NC, record, continue O2 as necessary to maintain sats > 90%  albuterol/ipratropium for Nebulization 3 milliLiter(s) Nebulizer every 6 hours  levoFLOXacin IVPB 750 milliGRAM(s) IV Intermittent every 24 hours  methylPREDNISolone sodium succinate Injectable 40 milliGRAM(s) IV Push every 8hours  AVAPS PRN.  , EPAP 8, min IPAP 14, max IPAP 25. O2 to keep Faiza@ >92%.  obtain full cultures sputum gm stain CS  check full Respiratory viral panel, Strep and Legionella Ag.   Nasal MRSA  OOB to chair  GI and DVT prophylaxis  pulmonary toilet to prevent aspirations  off loading of skin to prevent pressure ulcers  Monitor clinically, convert to oral prednisone as clinical improvement allows  Upon D/C the patient will need ICS/LABA, LAMA, PRN albuterol, finish abx, slow taper of steroids ie. start Prednisone 40 mg and taper 10 mg Q4d.  Pt to see me in the office to arrange the following once the infection has cleared (pt may go to [facility] for f/u where friends were treated)  Pt will need OP PET, followed by bx by IR if PET neg,   full PFT to see if she is a candidate for lung resection if Bx positive of PET  Repeat CT in several months to monitor the other nodules  the patient is aware that I am concerned she has a lung cancer.

## 2025-02-12 ENCOUNTER — TRANSCRIPTION ENCOUNTER (OUTPATIENT)
Age: 75
End: 2025-02-12

## 2025-02-12 VITALS
OXYGEN SATURATION: 94 % | TEMPERATURE: 98 F | SYSTOLIC BLOOD PRESSURE: 146 MMHG | DIASTOLIC BLOOD PRESSURE: 69 MMHG | HEART RATE: 72 BPM

## 2025-02-12 PROCEDURE — 99239 HOSP IP/OBS DSCHRG MGMT >30: CPT

## 2025-02-12 RX ORDER — MAGNESIUM SULFATE 0.8 MEQ/ML
2 AMPUL (ML) INJECTION ONCE
Refills: 0 | Status: COMPLETED | OUTPATIENT
Start: 2025-02-12 | End: 2025-02-12

## 2025-02-12 RX ORDER — ALBUTEROL 90 MCG
2 AEROSOL REFILL (GRAM) INHALATION
Qty: 0 | Refills: 0 | DISCHARGE
Start: 2025-02-12

## 2025-02-12 RX ORDER — LEVOFLOXACIN 500 MG/1
1 TABLET, FILM COATED ORAL
Qty: 5 | Refills: 0
Start: 2025-02-12 | End: 2025-02-16

## 2025-02-12 RX ORDER — PREDNISONE 5 MG/1
1 TABLET ORAL
Qty: 60 | Refills: 0
Start: 2025-02-12 | End: 2025-03-03

## 2025-02-12 RX ADMIN — DEXTROMETHORPHAN HBR AND GUAIFENESIN ORAL SOLUTION 10 MILLILITER(S): 10; 100 LIQUID ORAL at 01:05

## 2025-02-12 RX ADMIN — PREDNISONE 50 MILLIGRAM(S): 5 TABLET ORAL at 05:13

## 2025-02-12 RX ADMIN — ONDANSETRON 4 MILLIGRAM(S): 4 TABLET, ORALLY DISINTEGRATING ORAL at 12:44

## 2025-02-12 RX ADMIN — IPRATROPIUM BROMIDE AND ALBUTEROL SULFATE 3 MILLILITER(S): .5; 2.5 SOLUTION RESPIRATORY (INHALATION) at 08:42

## 2025-02-12 RX ADMIN — LEVOTHYROXINE SODIUM 88 MICROGRAM(S): 25 TABLET ORAL at 05:13

## 2025-02-12 RX ADMIN — Medication 25 GRAM(S): at 12:48

## 2025-02-12 RX ADMIN — IPRATROPIUM BROMIDE AND ALBUTEROL SULFATE 3 MILLILITER(S): .5; 2.5 SOLUTION RESPIRATORY (INHALATION) at 14:52

## 2025-02-12 RX ADMIN — DEXTROMETHORPHAN HBR AND GUAIFENESIN ORAL SOLUTION 10 MILLILITER(S): 10; 100 LIQUID ORAL at 12:49

## 2025-02-12 RX ADMIN — ONDANSETRON 4 MILLIGRAM(S): 4 TABLET, ORALLY DISINTEGRATING ORAL at 01:05

## 2025-02-12 NOTE — DISCHARGE NOTE PROVIDER - CARE PROVIDERS DIRECT ADDRESSES
[email or web address]

## 2025-02-12 NOTE — DISCHARGE NOTE PROVIDER - NSDCMRMEDTOKEN_GEN_ALL_CORE_FT
Crestor 20 mg oral tablet: 1 tab(s) orally once a day (at bedtime)  Synthroid 88 mcg (0.088 mg) oral tablet: 1 tab(s) orally once a day   albuterol 90 mcg/inh inhalation aerosol: 2 puff(s) inhaled every 6 hours As needed Shortness of Breath and/or Wheezing  Crestor 20 mg oral tablet: 1 tab(s) orally once a day (at bedtime)  Hurricaine 20% mucous membrane spray: Apply topically to affected area once a day  levoFLOXacin 750 mg oral tablet: 1 tab(s) orally once a day  predniSONE 10 mg oral tablet: 1 tab(s) orally once a day Take 5 tabs a day for 4 days then take 4 tabs a day for 4 days then take 3 tabs a day for 4 days then take 2 tabs a day for 4 days then take 1 tab a day for 4 days  Synthroid 88 mcg (0.088 mg) oral tablet: 1 tab(s) orally once a day

## 2025-02-12 NOTE — DISCHARGE NOTE PROVIDER - HOSPITAL COURSE
76 yo old female w PMHx as reviewed in the EMR who presented to the ED from  for hypoxia SOB and cough.     #Acute hypoxic respiratory failure   #PNA   #pulmonary nodules on CT chest   #spiculated mass suspicious for malignancy  #Hx COPD   CT chest + multiple RUL RML RLL & LLL pulmonary nodules; Mod centrilobular emphysema  Xray + lung base opacities   Blood/ sputum culture   Levaquin course   inflammatory markers / MRSA / legionella / strep   pulmonary consulted -> they made pt aware of spiculated mass - will need PET scan outpatient with them  duonebs q6  continue inhalers   O2 therapy w goal sats 88-92%   V/Q scan negative for PE    #Hemolyzed K   K 5.8, repeat stable     #Hx hypothyroidism   resume synthroid     Pt is medically dc ready home with outpatient pulm follow up for PET scan for spiculated mass, pt wants to go to [facility] for hemeonc care.

## 2025-02-12 NOTE — DISCHARGE NOTE NURSING/CASE MANAGEMENT/SOCIAL WORK - FINANCIAL ASSISTANCE
[facility] provides services at a reduced cost to those who are determined to be eligible through [facility]’s financial assistance program. Information regarding [facility]’s financial assistance program can be found by going to [email or web address] or by calling [phone].

## 2025-02-12 NOTE — DISCHARGE NOTE NURSING/CASE MANAGEMENT/SOCIAL WORK - PATIENT PORTAL LINK FT
You can access the FollowMyHealth Patient Portal offered by [facility] by registering at the following website: [email or web address]. By joining [facility]’s FollowMyHealth portal, you will also be able to view your health information using other applications (apps) compatible with our system.

## 2025-02-12 NOTE — DISCHARGE NOTE PROVIDER - PROVIDER TOKENS
Ms. Claros    Your recent test results are attached.  Thyroid test came back completely normal.  Continue current medications without changes.    If you have any questions or concerns please contact me via My Chart or call the clinic at [phone]     Thank You  Lindsay Padgett MD.  
PROVIDER:[TOKEN:[[id number]],FOLLOWUP:[1 week]],PROVIDER:[TOKEN:[[id number]],FOLLOWUP:[1 week]]

## 2025-02-12 NOTE — DISCHARGE NOTE PROVIDER - NSDCCPCAREPLAN_GEN_ALL_CORE_FT
PRINCIPAL DISCHARGE DIAGNOSIS  Diagnosis: Acute hypoxic respiratory failure  Assessment and Plan of Treatment: secondary to pneumonia  treated and improved  please continue medications as directed  follow up with your PCP and pulmonary in 1 week for reassessment      SECONDARY DISCHARGE DIAGNOSES  Diagnosis: Pulmonary nodules  Assessment and Plan of Treatment: spiculated mass  pulmonary team consulted and recommended outpatient PET scan for further assessment and management  please follow up with pulmonary     PRINCIPAL DISCHARGE DIAGNOSIS  Diagnosis: Acute hypoxic respiratory failure  Assessment and Plan of Treatment: You came to the hospital with shortness of breath.  You were treated for pneumonia with IV antibiotics and will go home on an oral antibiotic.  Please take this complete course as directed.  You will go home with inhalers.    You were found to have nodules on your lungs.  We are providing contact information for a pulmonologist that you can follow up with for further imaging and workup.  Please follow up with your PCP within two weeks.   If your symptoms return or worsen please return to the hospital.      SECONDARY DISCHARGE DIAGNOSES  Diagnosis: Pulmonary nodules  Assessment and Plan of Treatment: spiculated mass  pulmonary team consulted and recommended outpatient PET scan for further assessment and management  please follow up with pulmonary

## 2025-02-12 NOTE — DISCHARGE NOTE PROVIDER - ATTENDING DISCHARGE PHYSICAL EXAMINATION:
PHYSICAL EXAM:    Gen: NAD, resting in bed  HEENT: Normocephalic, atraumatic  Neck: supple, no lymphadenopathy  CV: Regular rate & regular rhythm  Lungs: decreased BS at bases, no fremitus  Abdomen: Soft, BS present  Ext: Warm, well perfused  Neuro: moves all extremities against resistance, no droop, awake  Skin: no rash, no erythema

## 2025-02-12 NOTE — DISCHARGE NOTE PROVIDER - CARE PROVIDER_API CALL
Chi Myers  [address] [address]  Phone: [phone]  Fax: [phone]  Follow Up Time: 1 week    Mauro Campuzano [place]  Pulmonary [address] [address]  Phone: [phone]  Fax: [phone]  Follow Up Time: 1 week

## 2025-02-13 ENCOUNTER — TRANSCRIPTION ENCOUNTER (OUTPATIENT)
Age: 75
End: 2025-02-13

## 2025-02-14 ENCOUNTER — APPOINTMENT (OUTPATIENT)
Age: 75
End: 2025-02-14
Payer: MEDICARE

## 2025-02-14 DIAGNOSIS — R91.8 OTHER NONSPECIFIC ABNORMAL FINDING OF LUNG FIELD: ICD-10-CM

## 2025-02-14 DIAGNOSIS — J18.9 PNEUMONIA, UNSPECIFIED ORGANISM: ICD-10-CM

## 2025-02-14 PROCEDURE — 99347 HOME/RES VST EST SF MDM 20: CPT | Mod: 93

## 2025-02-14 RX ORDER — LEVOFLOXACIN 750 MG/1
750 TABLET, FILM COATED ORAL
Refills: 0 | Status: ACTIVE | COMMUNITY
Start: 2025-02-14

## 2025-02-14 RX ORDER — PREDNISONE 10 MG/1
10 TABLET ORAL
Refills: 0 | Status: ACTIVE | COMMUNITY
Start: 2025-02-14

## 2025-02-14 RX ORDER — ALBUTEROL SULFATE 2.5 MG/3ML
(2.5 MG/3ML) SOLUTION RESPIRATORY (INHALATION)
Refills: 0 | Status: ACTIVE | COMMUNITY
Start: 2025-02-14

## 2025-02-15 LAB
CULTURE RESULTS: SIGNIFICANT CHANGE UP
CULTURE RESULTS: SIGNIFICANT CHANGE UP
SPECIMEN SOURCE: SIGNIFICANT CHANGE UP
SPECIMEN SOURCE: SIGNIFICANT CHANGE UP

## 2025-02-18 DIAGNOSIS — R91.8 OTHER NONSPECIFIC ABNORMAL FINDING OF LUNG FIELD: ICD-10-CM

## 2025-02-18 DIAGNOSIS — E03.9 HYPOTHYROIDISM, UNSPECIFIED: ICD-10-CM

## 2025-02-18 DIAGNOSIS — G90.512 COMPLEX REGIONAL PAIN SYNDROME I OF LEFT UPPER LIMB: ICD-10-CM

## 2025-02-18 DIAGNOSIS — J96.01 ACUTE RESPIRATORY FAILURE WITH HYPOXIA: ICD-10-CM

## 2025-02-18 DIAGNOSIS — Z79.890 HORMONE REPLACEMENT THERAPY: ICD-10-CM

## 2025-02-18 DIAGNOSIS — Z88.5 ALLERGY STATUS TO NARCOTIC AGENT: ICD-10-CM

## 2025-02-18 DIAGNOSIS — J43.9 EMPHYSEMA, UNSPECIFIED: ICD-10-CM

## 2025-02-18 DIAGNOSIS — Z91.041 RADIOGRAPHIC DYE ALLERGY STATUS: ICD-10-CM

## 2025-02-18 DIAGNOSIS — K83.9 DISEASE OF BILIARY TRACT, UNSPECIFIED: ICD-10-CM

## 2025-02-18 DIAGNOSIS — E87.5 HYPERKALEMIA: ICD-10-CM

## 2025-02-18 DIAGNOSIS — E78.5 HYPERLIPIDEMIA, UNSPECIFIED: ICD-10-CM

## 2025-02-18 DIAGNOSIS — Z79.82 LONG TERM (CURRENT) USE OF ASPIRIN: ICD-10-CM

## 2025-02-18 DIAGNOSIS — K21.9 GASTRO-ESOPHAGEAL REFLUX DISEASE WITHOUT ESOPHAGITIS: ICD-10-CM

## 2025-02-18 DIAGNOSIS — Z87.891 PERSONAL HISTORY OF NICOTINE DEPENDENCE: ICD-10-CM

## 2025-02-18 DIAGNOSIS — J18.9 PNEUMONIA, UNSPECIFIED ORGANISM: ICD-10-CM

## 2025-02-18 DIAGNOSIS — Z88.0 ALLERGY STATUS TO PENICILLIN: ICD-10-CM

## 2025-02-18 DIAGNOSIS — Z80.1 FAMILY HISTORY OF MALIGNANT NEOPLASM OF TRACHEA, BRONCHUS AND LUNG: ICD-10-CM

## 2025-02-18 DIAGNOSIS — Z11.52 ENCOUNTER FOR SCREENING FOR COVID-19: ICD-10-CM

## 2025-03-05 ENCOUNTER — TRANSCRIPTION ENCOUNTER (OUTPATIENT)
Age: 75
End: 2025-03-05

## 2025-03-14 ENCOUNTER — TRANSCRIPTION ENCOUNTER (OUTPATIENT)
Age: 75
End: 2025-03-14